# Patient Record
Sex: FEMALE | Race: WHITE | Employment: PART TIME | URBAN - METROPOLITAN AREA
[De-identification: names, ages, dates, MRNs, and addresses within clinical notes are randomized per-mention and may not be internally consistent; named-entity substitution may affect disease eponyms.]

---

## 2022-04-19 ENCOUNTER — INITIAL PRENATAL (OUTPATIENT)
Dept: OBGYN | Age: 32
End: 2022-04-19

## 2022-04-19 VITALS — DIASTOLIC BLOOD PRESSURE: 78 MMHG | SYSTOLIC BLOOD PRESSURE: 125 MMHG | HEART RATE: 75 BPM | WEIGHT: 157.3 LBS

## 2022-04-19 DIAGNOSIS — O26.21 RECURRENT PREGNANCY LOSS IN PREGNANT PATIENT IN FIRST TRIMESTER, ANTEPARTUM: ICD-10-CM

## 2022-04-19 DIAGNOSIS — O26.21 RECURRENT PREGNANCY LOSS IN PREGNANT PATIENT IN FIRST TRIMESTER, ANTEPARTUM: Primary | ICD-10-CM

## 2022-04-19 LAB — GONADOTROPIN, CHORIONIC (HCG) QUANT: 4703 MIU/ML

## 2022-04-19 NOTE — PROGRESS NOTES
Pt last period was 3/13/22  Pt denies concerns    ian was not seen by me today as she is approximately 5 weeks gestation based on her LMP with no ultrasound confirming dates. She has had 3 previous losses. Going to order hCG levels 48 hours apart then based on those we can determine ultrasound scheduling.

## 2022-04-21 LAB — GONADOTROPIN, CHORIONIC (HCG) QUANT: 8614 MIU/ML

## 2022-05-02 ENCOUNTER — TELEPHONE (OUTPATIENT)
Dept: OBGYN | Age: 32
End: 2022-05-02

## 2022-05-02 NOTE — TELEPHONE ENCOUNTER
----- Message from Rigo Portillo DO sent at 4/21/2022  3:01 PM EDT -----  [facility] has had appropriate rise in her hCG level she should now be scheduled for an ultrasound in 4 weeks MFM and then an appointment with me

## 2022-05-02 NOTE — TELEPHONE ENCOUNTER
Called ian advised of  rise in  hcg levels. I   scheduled appointment with dr. Anabel Lara for initial ob ultrasound on 5/17/22. Call was made on 04/26/22 I forgot to chart the phone call.

## 2022-05-17 ENCOUNTER — ANCILLARY PROCEDURE (OUTPATIENT)
Dept: OBGYN CLINIC | Age: 32
End: 2022-05-17
Payer: MEDICAID

## 2022-05-17 ENCOUNTER — INITIAL PRENATAL (OUTPATIENT)
Dept: OBGYN CLINIC | Age: 32
End: 2022-05-17

## 2022-05-17 ENCOUNTER — INITIAL PRENATAL (OUTPATIENT)
Dept: OBGYN | Age: 32
End: 2022-05-17
Payer: MEDICAID

## 2022-05-17 VITALS — SYSTOLIC BLOOD PRESSURE: 119 MMHG | WEIGHT: 156 LBS | HEART RATE: 101 BPM | DIASTOLIC BLOOD PRESSURE: 78 MMHG

## 2022-05-17 DIAGNOSIS — Z3A.09 9 WEEKS GESTATION OF PREGNANCY: Primary | ICD-10-CM

## 2022-05-17 DIAGNOSIS — Z34.90 PREGNANCY, UNSPECIFIED GESTATIONAL AGE: Primary | ICD-10-CM

## 2022-05-17 DIAGNOSIS — Z3A.09 9 WEEKS GESTATION OF PREGNANCY: ICD-10-CM

## 2022-05-17 LAB
AMPHETAMINE SCREEN, URINE: NOT DETECTED
BARBITURATE SCREEN URINE: NOT DETECTED
BENZODIAZEPINE SCREEN, URINE: NOT DETECTED
CANNABINOID SCREEN URINE: POSITIVE
COCAINE METABOLITE SCREEN URINE: NOT DETECTED
FENTANYL SCREEN, URINE: NOT DETECTED
GLUCOSE URINE, POC: NEGATIVE
HCT VFR BLD CALC: 42.6 % (ref 34–48)
HEMOGLOBIN: 14 G/DL (ref 11.5–15.5)
Lab: ABNORMAL
MCH RBC QN AUTO: 30.8 PG (ref 26–35)
MCHC RBC AUTO-ENTMCNC: 32.9 % (ref 32–34.5)
MCV RBC AUTO: 93.8 FL (ref 80–99.9)
METHADONE SCREEN, URINE: NOT DETECTED
OPIATE SCREEN URINE: NOT DETECTED
OXYCODONE URINE: NOT DETECTED
PDW BLD-RTO: 13.1 FL (ref 11.5–15)
PHENCYCLIDINE SCREEN URINE: NOT DETECTED
PLATELET # BLD: 251 E9/L (ref 130–450)
PMV BLD AUTO: 11.2 FL (ref 7–12)
PROTEIN UA: NEGATIVE
RBC # BLD: 4.54 E12/L (ref 3.5–5.5)
WBC # BLD: 7.9 E9/L (ref 4.5–11.5)

## 2022-05-17 PROCEDURE — 99999 PR OFFICE/OUTPT VISIT,PROCEDURE ONLY: CPT | Performed by: OBSTETRICS & GYNECOLOGY

## 2022-05-17 PROCEDURE — 99212 OFFICE O/P EST SF 10 MIN: CPT | Performed by: OBSTETRICS & GYNECOLOGY

## 2022-05-17 PROCEDURE — 81002 URINALYSIS NONAUTO W/O SCOPE: CPT | Performed by: OBSTETRICS & GYNECOLOGY

## 2022-05-17 PROCEDURE — 76817 TRANSVAGINAL US OBSTETRIC: CPT | Performed by: OBSTETRICS & GYNECOLOGY

## 2022-05-17 PROCEDURE — 99203 OFFICE O/P NEW LOW 30 MIN: CPT
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
## 2022-05-17 NOTE — PROGRESS NOTES
Pt here fpr 1st prenatal visit  Pt voiced not fm yet  Pt denies lof,vag bleeding or abd pain  Pt is in room 13

## 2022-05-17 NOTE — PROGRESS NOTES
The patient presented for an ultrasound only for viability/dating. Please see ultrasound report under imaging tab.

## 2022-05-17 NOTE — PATIENT INSTRUCTIONS
Please arrive for your scheduled appointment at least 15 minutes early with your actual insurance card+ a photo ID. Also if you need any refills ordered or have questions, it may take up 48 hours to reply. Please allow ample time for your refills. Call me when you use last refill. Thank you for your cooperation. Call your primary obstetrician with bleeding, leaking of fluid, abdominal tenderness, headache, blurry vision, epigastric pain and increased urinary frequency. If you are experiencing an emergency and need immediate help, call 911 or go to go emergency room or labor and delivery. If you are experiencing an emergency and need immediate help, call 911 or go to go emergency room or labor and delivery.

## 2022-05-18 LAB
ABO/RH: NORMAL
HEPATITIS B SURFACE ANTIGEN INTERPRETATION: NORMAL
HEPATITIS C ANTIBODY INTERPRETATION: NORMAL
HIV-1 AND HIV-2 ANTIBODIES: NORMAL
RPR: NORMAL

## 2022-05-19 LAB — RUBELLA ANTIBODY IGG: NORMAL

## 2022-05-20 ENCOUNTER — HOSPITAL ENCOUNTER (EMERGENCY)
Age: 32
Discharge: HOME | End: 2022-05-20
Payer: MEDICAID

## 2022-05-20 ENCOUNTER — APPOINTMENT (OUTPATIENT)
Dept: ULTRASOUND IMAGING | Age: 32
End: 2022-05-20
Payer: MEDICAID

## 2022-05-20 VITALS
HEIGHT: 63 IN | BODY MASS INDEX: 27.64 KG/M2 | SYSTOLIC BLOOD PRESSURE: 138 MMHG | OXYGEN SATURATION: 100 % | TEMPERATURE: 97.6 F | WEIGHT: 156 LBS | RESPIRATION RATE: 16 BRPM | HEART RATE: 65 BPM | DIASTOLIC BLOOD PRESSURE: 89 MMHG

## 2022-05-20 DIAGNOSIS — O46.8X1 SUBCHORIONIC HEMATOMA IN FIRST TRIMESTER, SINGLE OR UNSPECIFIED FETUS: ICD-10-CM

## 2022-05-20 DIAGNOSIS — O46.90 VAGINAL BLEEDING IN PREGNANCY: Primary | ICD-10-CM

## 2022-05-20 DIAGNOSIS — O41.8X10 SUBCHORIONIC HEMATOMA IN FIRST TRIMESTER, SINGLE OR UNSPECIFIED FETUS: ICD-10-CM

## 2022-05-20 LAB
ABO/RH: NORMAL
ALBUMIN SERPL-MCNC: 4.9 G/DL (ref 3.5–5.2)
ALP BLD-CCNC: 65 U/L (ref 35–104)
ALT SERPL-CCNC: 15 U/L (ref 0–32)
ANION GAP SERPL CALCULATED.3IONS-SCNC: 13 MMOL/L (ref 7–16)
AST SERPL-CCNC: 17 U/L (ref 0–31)
BACTERIA: ABNORMAL /HPF
BASOPHILS ABSOLUTE: 0.04 E9/L (ref 0–0.2)
BASOPHILS RELATIVE PERCENT: 0.4 % (ref 0–2)
BILIRUB SERPL-MCNC: 0.4 MG/DL (ref 0–1.2)
BILIRUBIN URINE: NEGATIVE
BLOOD, URINE: ABNORMAL
BUN BLDV-MCNC: 7 MG/DL (ref 6–20)
CALCIUM SERPL-MCNC: 9.3 MG/DL (ref 8.6–10.2)
CHLAMYDIA BY THIN PREP: NEGATIVE
CHLORIDE BLD-SCNC: 103 MMOL/L (ref 98–107)
CLARITY: CLEAR
CO2: 23 MMOL/L (ref 22–29)
COLOR: YELLOW
CREAT SERPL-MCNC: 0.5 MG/DL (ref 0.5–1)
EOSINOPHILS ABSOLUTE: 0.13 E9/L (ref 0.05–0.5)
EOSINOPHILS RELATIVE PERCENT: 1.4 % (ref 0–6)
GFR AFRICAN AMERICAN: >60
GFR NON-AFRICAN AMERICAN: >60 ML/MIN/1.73
GLUCOSE BLD-MCNC: 90 MG/DL (ref 74–99)
GLUCOSE URINE: NEGATIVE MG/DL
GONADOTROPIN, CHORIONIC (HCG) QUANT: ABNORMAL MIU/ML
HCG, URINE, POC: POSITIVE
HCT VFR BLD CALC: 42.3 % (ref 34–48)
HEMOGLOBIN: 14.3 G/DL (ref 11.5–15.5)
IMMATURE GRANULOCYTES #: 0.04 E9/L
IMMATURE GRANULOCYTES %: 0.4 % (ref 0–5)
KETONES, URINE: NEGATIVE MG/DL
LEUKOCYTE ESTERASE, URINE: NEGATIVE
LYMPHOCYTES ABSOLUTE: 1.68 E9/L (ref 1.5–4)
LYMPHOCYTES RELATIVE PERCENT: 18.3 % (ref 20–42)
Lab: NORMAL
MCH RBC QN AUTO: 31.3 PG (ref 26–35)
MCHC RBC AUTO-ENTMCNC: 33.8 % (ref 32–34.5)
MCV RBC AUTO: 92.6 FL (ref 80–99.9)
MONOCYTES ABSOLUTE: 0.46 E9/L (ref 0.1–0.95)
MONOCYTES RELATIVE PERCENT: 5 % (ref 2–12)
N. GONORRHOEAE DNA, THIN PREP: NEGATIVE
NEGATIVE QC PASS/FAIL: NORMAL
NEUTROPHILS ABSOLUTE: 6.84 E9/L (ref 1.8–7.3)
NEUTROPHILS RELATIVE PERCENT: 74.5 % (ref 43–80)
NITRITE, URINE: NEGATIVE
PDW BLD-RTO: 13 FL (ref 11.5–15)
PH UA: 7.5 (ref 5–9)
PLATELET # BLD: 239 E9/L (ref 130–450)
PMV BLD AUTO: 10 FL (ref 7–12)
POSITIVE QC PASS/FAIL: NORMAL
POTASSIUM SERPL-SCNC: 3.8 MMOL/L (ref 3.5–5)
PROTEIN UA: NEGATIVE MG/DL
RBC # BLD: 4.57 E12/L (ref 3.5–5.5)
RBC UA: ABNORMAL /HPF (ref 0–2)
SODIUM BLD-SCNC: 139 MMOL/L (ref 132–146)
SOURCE: NORMAL
SPECIFIC GRAVITY UA: <=1.005 (ref 1–1.03)
TOTAL PROTEIN: 7.7 G/DL (ref 6.4–8.3)
URINE CULTURE, ROUTINE: NORMAL
UROBILINOGEN, URINE: 0.2 E.U./DL
WBC # BLD: 9.2 E9/L (ref 4.5–11.5)
WBC UA: ABNORMAL /HPF (ref 0–5)

## 2022-05-20 PROCEDURE — 84702 CHORIONIC GONADOTROPIN TEST: CPT

## 2022-05-20 PROCEDURE — 36415 COLL VENOUS BLD VENIPUNCTURE: CPT

## 2022-05-20 PROCEDURE — 99284 EMERGENCY DEPT VISIT MOD MDM: CPT

## 2022-05-20 PROCEDURE — 85025 COMPLETE CBC W/AUTO DIFF WBC: CPT

## 2022-05-20 PROCEDURE — 81001 URINALYSIS AUTO W/SCOPE: CPT

## 2022-05-20 PROCEDURE — 86900 BLOOD TYPING SEROLOGIC ABO: CPT

## 2022-05-20 PROCEDURE — 86901 BLOOD TYPING SEROLOGIC RH(D): CPT

## 2022-05-20 PROCEDURE — 80053 COMPREHEN METABOLIC PANEL: CPT

## 2022-05-20 PROCEDURE — 76801 OB US < 14 WKS SINGLE FETUS: CPT

## 2022-05-20 ASSESSMENT — PAIN - FUNCTIONAL ASSESSMENT: PAIN_FUNCTIONAL_ASSESSMENT: NONE - DENIES PAIN

## 2022-05-20 NOTE — ED NOTES
Department of Emergency Medicine  FIRST PROVIDER TRIAGE NOTE             Independent MLP           5/20/22  12:58 PM EDT    Date of Encounter: 5/20/22   MRN: [id number]      HPI: Alex Cooper is a 36 y.o. female who presents to the ED for Vaginal Bleeding (vaginal bleeding onset 30 min PTA, 10w pregnant)     ROS: Negative for Suicidal ideation or Homicidal Ideation. PE: Gen Appearance/Constitutional: alert     Initial Plan of Care: All treatment areas with department are currently occupied. Plan to order/Initiate the following while awaiting opening in ED: labs and ultrasound.   Initiate Treatment-Testing, Proceed toTreatment Area When Bed Available for ED Attending/MLP to Continue Care    Electronically signed by Denise Hogan PA-C   DD: 5/20/22         Denise Hogan PA-C  05/20/22 1404
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
## 2022-06-16 ENCOUNTER — ROUTINE PRENATAL (OUTPATIENT)
Dept: OBGYN | Age: 32
End: 2022-06-16
Payer: MEDICAID

## 2022-06-16 VITALS
BODY MASS INDEX: 28.34 KG/M2 | SYSTOLIC BLOOD PRESSURE: 130 MMHG | WEIGHT: 160 LBS | DIASTOLIC BLOOD PRESSURE: 88 MMHG | HEART RATE: 82 BPM

## 2022-06-16 DIAGNOSIS — Z3A.13 13 WEEKS GESTATION OF PREGNANCY: Primary | ICD-10-CM

## 2022-06-16 DIAGNOSIS — Z20.2 EXPOSURE TO TRICHOMONAS: ICD-10-CM

## 2022-06-16 LAB
GLUCOSE URINE, POC: NEGATIVE
PROTEIN UA: NEGATIVE

## 2022-06-16 PROCEDURE — 99213 OFFICE O/P EST LOW 20 MIN: CPT | Performed by: OBSTETRICS & GYNECOLOGY

## 2022-06-16 PROCEDURE — 81002 URINALYSIS NONAUTO W/O SCOPE: CPT | Performed by: OBSTETRICS & GYNECOLOGY

## 2022-06-16 PROCEDURE — 99213 OFFICE O/P EST LOW 20 MIN: CPT

## 2022-06-16 NOTE — PROGRESS NOTES
Pt here for ob visit  Pt voiced nausea in am and a lot of fatigue  Pt voiced 2 days after last appointment was seen in er for bleeding (5/20) all was ok  Pt voiced spotting after urinating a few times but denies lof  Pt voiced on right side only twinges that are uncomfotable

## 2022-06-16 NOTE — PROGRESS NOTES
Labs reviewed needs rubella postpartum reviewed THC in pregnancy she has reported stopping. She is now seeing a counselor for depression. Reviewed trichomonas on Pap culture obtained declines panorama and carrier status screening. Declined quad screen.   Nausea significantly improved

## 2022-06-18 LAB — CULTURE, TRICHOMONAS: NORMAL

## 2022-06-20 NOTE — PROGRESS NOTES
She tested positive for trichomonas on culture I will be sending in Flagyl 250 3 times daily for 7 days

## 2022-06-21 ENCOUNTER — TELEPHONE (OUTPATIENT)
Dept: OBGYN | Age: 32
End: 2022-06-21

## 2022-06-21 RX ORDER — METRONIDAZOLE 250 MG/1
250 TABLET ORAL 3 TIMES DAILY
Qty: 21 TABLET | Refills: 0 | Status: SHIPPED | OUTPATIENT
Start: 2022-06-21 | End: 2022-06-22 | Stop reason: SDUPTHER

## 2022-06-22 DIAGNOSIS — Z34.90 PREGNANCY, UNSPECIFIED GESTATIONAL AGE: Primary | ICD-10-CM

## 2022-06-22 RX ORDER — METRONIDAZOLE 250 MG/1
250 TABLET ORAL 3 TIMES DAILY
Qty: 21 TABLET | Refills: 0 | Status: SHIPPED | OUTPATIENT
Start: 2022-06-22 | End: 2022-06-29

## 2022-07-21 ENCOUNTER — ROUTINE PRENATAL (OUTPATIENT)
Dept: OBGYN | Age: 32
End: 2022-07-21
Payer: MEDICAID

## 2022-07-21 VITALS
SYSTOLIC BLOOD PRESSURE: 122 MMHG | DIASTOLIC BLOOD PRESSURE: 74 MMHG | HEART RATE: 88 BPM | BODY MASS INDEX: 31.18 KG/M2 | WEIGHT: 176 LBS

## 2022-07-21 DIAGNOSIS — Z3A.18 18 WEEKS GESTATION OF PREGNANCY: Primary | ICD-10-CM

## 2022-07-21 LAB
GLUCOSE URINE, POC: NEGATIVE
PROTEIN UA: NEGATIVE

## 2022-07-21 PROCEDURE — 99213 OFFICE O/P EST LOW 20 MIN: CPT

## 2022-07-21 PROCEDURE — 81002 URINALYSIS NONAUTO W/O SCOPE: CPT | Performed by: OBSTETRICS & GYNECOLOGY

## 2022-07-21 PROCEDURE — 99213 OFFICE O/P EST LOW 20 MIN: CPT | Performed by: OBSTETRICS & GYNECOLOGY

## 2022-07-21 NOTE — PROGRESS NOTES
Pt here for ob visit  No fm yet  Pt denies lof vag bleeding or contractions  Pt voiced sister has to have cs at 36 weeks d/t placenta abruption and has been on bed rest since 32 weeks.

## 2022-08-04 ENCOUNTER — ROUTINE PRENATAL (OUTPATIENT)
Dept: OBGYN CLINIC | Age: 32
End: 2022-08-04
Payer: MEDICAID

## 2022-08-04 ENCOUNTER — ANCILLARY PROCEDURE (OUTPATIENT)
Dept: OBGYN CLINIC | Age: 32
End: 2022-08-04
Payer: MEDICAID

## 2022-08-04 VITALS
SYSTOLIC BLOOD PRESSURE: 130 MMHG | DIASTOLIC BLOOD PRESSURE: 85 MMHG | BODY MASS INDEX: 32.08 KG/M2 | WEIGHT: 181.13 LBS | HEART RATE: 101 BPM

## 2022-08-04 DIAGNOSIS — Z3A.20 20 WEEKS GESTATION OF PREGNANCY: ICD-10-CM

## 2022-08-04 DIAGNOSIS — Z36.3 ENCOUNTER FOR ANTENATAL SCREENING FOR MALFORMATION USING ULTRASOUND: Primary | ICD-10-CM

## 2022-08-04 DIAGNOSIS — Z03.75 SUSPECTED SHORTENING OF CERVIX NOT FOUND: ICD-10-CM

## 2022-08-04 LAB
GLUCOSE URINE, POC: NEGATIVE
PROTEIN UA: NEGATIVE

## 2022-08-04 PROCEDURE — 76817 TRANSVAGINAL US OBSTETRIC: CPT | Performed by: OBSTETRICS & GYNECOLOGY

## 2022-08-04 PROCEDURE — 81002 URINALYSIS NONAUTO W/O SCOPE: CPT | Performed by: OBSTETRICS & GYNECOLOGY

## 2022-08-04 PROCEDURE — 76811 OB US DETAILED SNGL FETUS: CPT | Performed by: OBSTETRICS & GYNECOLOGY

## 2022-08-04 PROCEDURE — 99213 OFFICE O/P EST LOW 20 MIN: CPT | Performed by: OBSTETRICS & GYNECOLOGY

## 2022-08-04 PROCEDURE — 99999 PR OFFICE/OUTPT VISIT,PROCEDURE ONLY: CPT | Performed by: OBSTETRICS & GYNECOLOGY

## 2022-08-04 NOTE — PROGRESS NOTES
22    Juan Pritchard, DO  [address] [address] [facility]     RE:  Shaye Wilkins  : 1990   AGE: 36 y.o. Dear Dr. Madeline Rucker:    Adia Nye scheduled in my office today for a fetal ultrasound assessment only. A detailed ultrasound report is included in the EMR under the media tab from today's date. A living steele intrauterine fetus was identified in the cephalic presentation, with normal fetal heart motion and normal fetal motion noted. The amniotic fluid volume was within normal limits. The estimated fetal weight was 376 grams. The placenta was on the anterior surface of the uterus. The biometric measurements were consistent with the patient's established dating parameters, within the limits of error the test at the current gestational age. No apparent gross fetal anatomic abnormalities were identified in the areas which were visualized. Ultrasound is not diagnostic for fetal aneuploidy or other karyotype abnormalities, and may not detect all structural fetal defects, even if multiple examinations are performed during a  pregnancy. Normal ultrasound findings did not equate with a normal fetal outcome. Transvaginal ultrasound assessment of the cervix was performed. The cervical length was 44.9 mm, without funneling of the amniotic membranes. No further follow-up assessments have been scheduled in our office, however; we would be happy to see your patient at any time if you request.    A repeat scan in the third trimester is recommended to reassess fetal anatomy and growth. Some fetal anomalies may not be apparent on the initial assessment but develop as the pregnancy progresses.      If you have any questions regarding her management, please contact me at your convenience and thank you for allowing me to participate in her care    Sincerely,        Arun Rodriguez MD, MS, FACOG, FACS, RDCS, [address], [facility]  Director [facility]  [phone]

## 2022-08-04 NOTE — PATIENT INSTRUCTIONS
Please arrive for your scheduled appointment at least 15 minutes early with your actual insurance card+ a photo ID. Also if you need any refills ordered or have questions, it may take up 48 hours to reply. Please allow ample time for your refills. Call me when you use last refill. Thank you for your cooperation. Call your primary obstetrician with bleeding, leaking of fluid, abdominal tenderness, headache, blurry vision, epigastric pain and increased urinary frequency. If you are experiencing an emergency and need immediate help, call 911 or go to go emergency room or labor and delivery. if you are sick, not feeling well or have an infectious process going on please reschedule your appointment by calling [phone]. Also if any family members are not feeling well, please do not bring them to your appointment. We appreciate your cooperation. We are doing this in order to protect our pregnant mothers+ their babies. if you are sick, not feeling well or have an infectious process going on please reschedule your appointment by calling [phone]. Also if any family members are not feeling well, please do not bring them to your appointment. We appreciate your cooperation. We are doing this in order to protect our pregnant mothers+ their babies.

## 2022-08-18 ENCOUNTER — INITIAL PRENATAL (OUTPATIENT)
Dept: OBGYN | Age: 32
End: 2022-08-18
Payer: MEDICAID

## 2022-08-18 VITALS
SYSTOLIC BLOOD PRESSURE: 124 MMHG | HEART RATE: 109 BPM | BODY MASS INDEX: 32.95 KG/M2 | DIASTOLIC BLOOD PRESSURE: 78 MMHG | WEIGHT: 186 LBS

## 2022-08-18 DIAGNOSIS — Z3A.22 22 WEEKS GESTATION OF PREGNANCY: Primary | ICD-10-CM

## 2022-08-18 LAB
GLUCOSE URINE, POC: NEGATIVE
PROTEIN UA: NEGATIVE

## 2022-08-18 PROCEDURE — 81002 URINALYSIS NONAUTO W/O SCOPE: CPT | Performed by: OBSTETRICS & GYNECOLOGY

## 2022-08-18 PROCEDURE — 99213 OFFICE O/P EST LOW 20 MIN: CPT | Performed by: OBSTETRICS & GYNECOLOGY

## 2022-08-18 NOTE — PATIENT INSTRUCTIONS
Please arrive for your scheduled appointment at least 15 minutes early with your actual insurance card+ a photo ID. Also if you need any refills ordered or have questions, it may take up 48 hours to reply. Please allow ample time for your refills. Call me when you use last refill. Thank you for your cooperation. You might be having an NST at your next appt. Please eat a large snack or breakfast before coming to office. Thank you Call your primary obstetrician with bleeding, leaking of fluid, abdominal tenderness, headache, blurry vision, epigastric pain and increased urinary frequency. If you are experiencing an emergency and need immediate help, call 911 or go to go emergency room or labor and delivery. Do kick counts after dinner. Call your primary obstetrician if less than 10 kicks in 2 hours after dinner. Call your primary obstetrician with bleeding, leaking of fluid, abdominal tenderness, headache, blurry vision, epigastric pain and increased urinary frequency. Do kick counts after dinner. Call your primary obstetrician if less than 10 kicks in 2 hours after dinner. Call your primary obstetrician with bleeding, leaking of fluid, abdominal tenderness, headache, blurry vision, epigastric pain and increased urinary frequency.

## 2022-08-18 NOTE — PROGRESS NOTES
US reviewed normal anatomy. No BLD, LO F or CTN's. Has counseling appointment this afternoon feels she is doing well.

## 2022-08-18 NOTE — PROGRESS NOTES
Routine ob visit  +fm  No vag or contactions  Pt voiced her sister had emergency cs placenta detatched

## 2022-09-14 ENCOUNTER — ROUTINE PRENATAL (OUTPATIENT)
Dept: OBGYN | Age: 32
End: 2022-09-14
Payer: MEDICAID

## 2022-09-14 VITALS
HEART RATE: 89 BPM | SYSTOLIC BLOOD PRESSURE: 120 MMHG | WEIGHT: 196 LBS | BODY MASS INDEX: 34.72 KG/M2 | DIASTOLIC BLOOD PRESSURE: 82 MMHG

## 2022-09-14 DIAGNOSIS — Z3A.26 26 WEEKS GESTATION OF PREGNANCY: Primary | ICD-10-CM

## 2022-09-14 LAB
GLUCOSE URINE, POC: NEGATIVE
PROTEIN UA: NEGATIVE

## 2022-09-14 PROCEDURE — 99213 OFFICE O/P EST LOW 20 MIN: CPT | Performed by: OBSTETRICS & GYNECOLOGY

## 2022-09-14 PROCEDURE — 81002 URINALYSIS NONAUTO W/O SCOPE: CPT | Performed by: OBSTETRICS & GYNECOLOGY

## 2022-09-14 NOTE — PROGRESS NOTES
Seeing counselor every 2 weeks doing much better.   No BLD, LO F or CTN's.  26/28-week labs given  Tdap next visit

## 2022-09-14 NOTE — PATIENT INSTRUCTIONS
Please arrive for your scheduled appointment at least 15 minutes early with your actual insurance card+ a photo ID. Also if you need any refills ordered or have questions, it may take up 48 hours to reply. Please allow ample time for your refills. Call me when you use last refill. Thank you for your cooperation. You might be having an NST at your next appt. Please eat a large snack or breakfast before coming to office. Thank you If you are experiencing an emergency and need immediate help, call 911 or go to go emergency room or labor and delivery. Do kick counts after dinner. Call your primary obstetrician if less than 10 kicks in 2 hours after dinner. Call your primary obstetrician with bleeding, leaking of fluid, abdominal tenderness, headache, blurry vision, epigastric pain and increased urinary frequency. Do kick counts after dinner. Call your primary obstetrician if less than 10 kicks in 2 hours after dinner. Call your primary obstetrician with bleeding, leaking of fluid, abdominal tenderness, headache, blurry vision, epigastric pain and increased urinary frequency.

## 2022-09-19 DIAGNOSIS — Z3A.26 26 WEEKS GESTATION OF PREGNANCY: ICD-10-CM

## 2022-09-19 LAB
GLUCOSE FASTING: 83 MG/DL
GLUCOSE TOLERANCE SCREEN 50G: 129 MG/DL (ref 70–140)
GLUCOSE TOLERANCE TEST 2 HOUR: 135 MG/DL
HCT VFR BLD CALC: 34.7 % (ref 34–48)
HEMOGLOBIN: 11.8 G/DL (ref 11.5–15.5)
MCH RBC QN AUTO: 32.5 PG (ref 26–35)
MCHC RBC AUTO-ENTMCNC: 34 % (ref 32–34.5)
MCV RBC AUTO: 95.6 FL (ref 80–99.9)
PDW BLD-RTO: 12.7 FL (ref 11.5–15)
PLATELET # BLD: 240 E9/L (ref 130–450)
PMV BLD AUTO: 10.6 FL (ref 7–12)
RBC # BLD: 3.63 E12/L (ref 3.5–5.5)
WBC # BLD: 7.9 E9/L (ref 4.5–11.5)

## 2022-09-20 LAB — RPR: NORMAL

## 2022-09-29 ENCOUNTER — ROUTINE PRENATAL (OUTPATIENT)
Dept: OBGYN | Age: 32
End: 2022-09-29
Payer: MEDICAID

## 2022-09-29 VITALS
DIASTOLIC BLOOD PRESSURE: 64 MMHG | WEIGHT: 199.9 LBS | BODY MASS INDEX: 35.41 KG/M2 | SYSTOLIC BLOOD PRESSURE: 120 MMHG | HEART RATE: 81 BPM

## 2022-09-29 DIAGNOSIS — Z3A.28 28 WEEKS GESTATION OF PREGNANCY: Primary | ICD-10-CM

## 2022-09-29 LAB
GLUCOSE URINE, POC: NEGATIVE
PROTEIN UA: NEGATIVE

## 2022-09-29 PROCEDURE — 99213 OFFICE O/P EST LOW 20 MIN: CPT | Performed by: OBSTETRICS & GYNECOLOGY

## 2022-09-29 PROCEDURE — 81002 URINALYSIS NONAUTO W/O SCOPE: CPT | Performed by: OBSTETRICS & GYNECOLOGY

## 2022-09-29 NOTE — PROGRESS NOTES
Tdap today. Labs reviewed all normal.  No BLD, LO F or CTN's. No S&S of preeclampsia. Has counseling appointment today doing well emotionally.

## 2022-09-29 NOTE — PROGRESS NOTES
Pt here for routine ob  +fm  Pt denies lof vag bleeding or contractions  Pt voiced baby less active Monday and Tuesday but last 2 days back to normal  T-dap given today

## 2022-10-10 ENCOUNTER — ROUTINE PRENATAL (OUTPATIENT)
Dept: OBGYN | Age: 32
End: 2022-10-10
Payer: MEDICAID

## 2022-10-10 VITALS
BODY MASS INDEX: 35.75 KG/M2 | HEART RATE: 112 BPM | DIASTOLIC BLOOD PRESSURE: 80 MMHG | SYSTOLIC BLOOD PRESSURE: 117 MMHG | WEIGHT: 201.8 LBS

## 2022-10-10 DIAGNOSIS — Z3A.30 30 WEEKS GESTATION OF PREGNANCY: Primary | ICD-10-CM

## 2022-10-10 LAB
GLUCOSE URINE, POC: NEGATIVE
PROTEIN UA: NEGATIVE

## 2022-10-10 PROCEDURE — 81002 URINALYSIS NONAUTO W/O SCOPE: CPT | Performed by: OBSTETRICS & GYNECOLOGY

## 2022-10-10 PROCEDURE — 99213 OFFICE O/P EST LOW 20 MIN: CPT | Performed by: OBSTETRICS & GYNECOLOGY

## 2022-10-10 NOTE — PROGRESS NOTES
Emotionally doing well. No SS PIH. No BLD, LO F or CTN's. Sleep is sometimes an issue. Reviewed Benadryl is okay to take. Ultrasound in 2 weeks for growth.  Desires nexplanon

## 2022-10-27 ENCOUNTER — ROUTINE PRENATAL (OUTPATIENT)
Dept: OBGYN CLINIC | Age: 32
End: 2022-10-27

## 2022-10-27 ENCOUNTER — ANCILLARY PROCEDURE (OUTPATIENT)
Dept: OBGYN CLINIC | Age: 32
End: 2022-10-27
Payer: MEDICAID

## 2022-10-27 ENCOUNTER — ROUTINE PRENATAL (OUTPATIENT)
Dept: OBGYN | Age: 32
End: 2022-10-27
Payer: MEDICAID

## 2022-10-27 VITALS
WEIGHT: 200 LBS | DIASTOLIC BLOOD PRESSURE: 74 MMHG | SYSTOLIC BLOOD PRESSURE: 120 MMHG | HEART RATE: 99 BPM | BODY MASS INDEX: 35.43 KG/M2

## 2022-10-27 DIAGNOSIS — Z34.90 PREGNANCY, UNSPECIFIED GESTATIONAL AGE: ICD-10-CM

## 2022-10-27 DIAGNOSIS — Z03.74 FETAL GROWTH PROBLEM SUSPECTED BUT NOT FOUND: Primary | ICD-10-CM

## 2022-10-27 DIAGNOSIS — Z3A.32 32 WEEKS GESTATION OF PREGNANCY: Primary | ICD-10-CM

## 2022-10-27 PROBLEM — Z36.3 ENCOUNTER FOR ANTENATAL SCREENING FOR MALFORMATION USING ULTRASOUND: Status: RESOLVED | Noted: 2022-08-04 | Resolved: 2022-10-27

## 2022-10-27 LAB
GLUCOSE URINE, POC: NEGATIVE
PROTEIN UA: NEGATIVE

## 2022-10-27 PROCEDURE — 99999 PR OFFICE/OUTPT VISIT,PROCEDURE ONLY: CPT | Performed by: OBSTETRICS & GYNECOLOGY

## 2022-10-27 PROCEDURE — 81002 URINALYSIS NONAUTO W/O SCOPE: CPT | Performed by: OBSTETRICS & GYNECOLOGY

## 2022-10-27 PROCEDURE — 99213 OFFICE O/P EST LOW 20 MIN: CPT | Performed by: OBSTETRICS & GYNECOLOGY

## 2022-10-27 PROCEDURE — 76805 OB US >/= 14 WKS SNGL FETUS: CPT | Performed by: OBSTETRICS & GYNECOLOGY

## 2022-10-27 NOTE — PROGRESS NOTES
Doing well emotionally continues with counseling. No SS PIH. Good FM. No CTN's. Afluria at today. Ultrasound for growth today. Consent for Nexplanon signed today.

## 2022-10-27 NOTE — PROGRESS NOTES
10/27/22     RE:  Penelope Blankenship  : 1990   AGE: 36 y.o. Dear Dr. Orlando Fairly:    Jose David Santillandaquan scheduled in my office today for a fetal ultrasound assessment only. A detailed ultrasound report is included in the EMR under the media tab from today's date. A living steele intrauterine fetus was identified in the cephalic presentation, with normal fetal heart motion and normal fetal motion noted. The placenta was on the anterior surface of the uterus. The amniotic fluid index was 16.7 cm. The estimated fetal weight was at the 62nd growth percentile for gestational age. No apparent gross fetal anatomic abnormalities were identified in the areas which were visualized. The biophysical profile and cord Doppler studies were both reassuring. There was no evidence of absence, or reversal of end-diastolic flow. Ultrasound is not diagnostic for fetal aneuploidy or other karyotype abnormalities, and may not detect all structural fetal defects, even if multiple examinations are performed during a  pregnancy. Normal ultrasound findings did not equate with a normal fetal outcome.     No further follow-up assessments have been scheduled in our office, however; we would be happy to see your patient at any time if you request.    If you have any questions regarding her management, please contact me at your convenience and thank you for allowing me to participate in her care    Sincerely,        Nancy Alcaraz MD, MS, Tyesha Soto, [address] [facility], [facility] Garcia, [facility]  Director [address] [place] [address]  [phone]

## 2022-11-10 ENCOUNTER — ROUTINE PRENATAL (OUTPATIENT)
Dept: OBGYN | Age: 32
End: 2022-11-10
Payer: MEDICAID

## 2022-11-10 VITALS
WEIGHT: 210.4 LBS | SYSTOLIC BLOOD PRESSURE: 130 MMHG | HEART RATE: 82 BPM | DIASTOLIC BLOOD PRESSURE: 82 MMHG | BODY MASS INDEX: 37.27 KG/M2

## 2022-11-10 DIAGNOSIS — Z34.80 SUPERVISION OF OTHER NORMAL PREGNANCY, ANTEPARTUM: Primary | ICD-10-CM

## 2022-11-10 LAB
GLUCOSE URINE, POC: NEGATIVE
PROTEIN UA: NEGATIVE

## 2022-11-10 PROCEDURE — 99213 OFFICE O/P EST LOW 20 MIN: CPT | Performed by: OBSTETRICS & GYNECOLOGY

## 2022-11-10 PROCEDURE — 81002 URINALYSIS NONAUTO W/O SCOPE: CPT | Performed by: OBSTETRICS & GYNECOLOGY

## 2022-11-10 NOTE — PROGRESS NOTES
Good FM no BLD, LO F or CTN's. Does note pelvic pressure. No SS PIH.   32-week ultrasound appropriate growth

## 2022-11-10 NOTE — PROGRESS NOTES
Routine ob  +fm  Pt denies lof vag bleeding or contractions  Pt voiced baby feels low and a lot of pelvic pressure and round ligament pain

## 2022-11-23 ENCOUNTER — ROUTINE PRENATAL (OUTPATIENT)
Dept: OBGYN | Age: 32
End: 2022-11-23
Payer: MEDICAID

## 2022-11-23 VITALS
SYSTOLIC BLOOD PRESSURE: 124 MMHG | WEIGHT: 214 LBS | BODY MASS INDEX: 37.91 KG/M2 | DIASTOLIC BLOOD PRESSURE: 79 MMHG | HEART RATE: 94 BPM

## 2022-11-23 DIAGNOSIS — Z34.80 SUPERVISION OF OTHER NORMAL PREGNANCY, ANTEPARTUM: ICD-10-CM

## 2022-11-23 DIAGNOSIS — Z34.80 SUPERVISION OF OTHER NORMAL PREGNANCY, ANTEPARTUM: Primary | ICD-10-CM

## 2022-11-23 LAB
PROTEIN UA: NEGATIVE
PROTEIN UA: NEGATIVE

## 2022-11-23 PROCEDURE — 81002 URINALYSIS NONAUTO W/O SCOPE: CPT | Performed by: OBSTETRICS & GYNECOLOGY

## 2022-11-23 PROCEDURE — 99213 OFFICE O/P EST LOW 20 MIN: CPT | Performed by: OBSTETRICS & GYNECOLOGY

## 2022-11-23 NOTE — PROGRESS NOTES
Routine prenatal  +fm  Pt denies lof vag bleeding or conrtractions  Pt voiced lower back cramping and legs cramping

## 2022-11-27 LAB — GROUP B STREP CULTURE: NORMAL

## 2022-12-01 ENCOUNTER — ROUTINE PRENATAL (OUTPATIENT)
Dept: OBGYN CLINIC | Age: 32
End: 2022-12-01
Payer: MEDICAID

## 2022-12-01 VITALS
WEIGHT: 214 LBS | DIASTOLIC BLOOD PRESSURE: 89 MMHG | SYSTOLIC BLOOD PRESSURE: 138 MMHG | BODY MASS INDEX: 37.91 KG/M2 | HEART RATE: 110 BPM

## 2022-12-01 DIAGNOSIS — Z34.80 SUPERVISION OF OTHER NORMAL PREGNANCY, ANTEPARTUM: Primary | ICD-10-CM

## 2022-12-01 LAB
GLUCOSE URINE, POC: NEGATIVE
PROTEIN UA: NEGATIVE

## 2022-12-01 PROCEDURE — 81002 URINALYSIS NONAUTO W/O SCOPE: CPT | Performed by: OBSTETRICS & GYNECOLOGY

## 2022-12-01 PROCEDURE — 99213 OFFICE O/P EST LOW 20 MIN: CPT | Performed by: OBSTETRICS & GYNECOLOGY

## 2022-12-01 NOTE — LETTER
[address] [place] [address]  [address] [facility] [address] [address]  Dept: [phone]  Dept Fax: [phone]                12/1/2022    To Whom It May Concern:    RE: Corie Vides PRS:[id number]      Corie Vides is a pregnant patient who requires dental care. Please use an abdominal shield for any x-ray studies. Please avoid non-steroidal anti-inflammatory agents, such as ibuprofen or naproxen. Antibiotics are safe other than tetracycline. Acetaminophen and narcotics are safe to administer. Local anesthetics (e.g. lidocaine) are permissible also. If you have additional questions, feel free to contact us at [phone] and leave a message using the patient's medical record number listed beneath her name. Thank you for caring for our patient.     Sincerely,        Al Green DO  OB/GYN Department

## 2022-12-07 ENCOUNTER — ROUTINE PRENATAL (OUTPATIENT)
Dept: OBGYN | Age: 32
End: 2022-12-07
Payer: MEDICAID

## 2022-12-07 VITALS
SYSTOLIC BLOOD PRESSURE: 126 MMHG | BODY MASS INDEX: 38.24 KG/M2 | WEIGHT: 215.9 LBS | DIASTOLIC BLOOD PRESSURE: 90 MMHG | HEART RATE: 90 BPM

## 2022-12-07 DIAGNOSIS — Z34.93 SUPERVISION OF LOW-RISK PREGNANCY, THIRD TRIMESTER: Primary | ICD-10-CM

## 2022-12-07 DIAGNOSIS — Z34.00 SUPERVISION OF NORMAL FIRST PREGNANCY, ANTEPARTUM: ICD-10-CM

## 2022-12-07 LAB
GLUCOSE URINE, POC: NEGATIVE
PROTEIN UA: NEGATIVE

## 2022-12-07 PROCEDURE — 99213 OFFICE O/P EST LOW 20 MIN: CPT | Performed by: ADVANCED PRACTICE MIDWIFE

## 2022-12-07 PROCEDURE — 81002 URINALYSIS NONAUTO W/O SCOPE: CPT | Performed by: ADVANCED PRACTICE MIDWIFE

## 2022-12-07 NOTE — PROGRESS NOTES
Routine ob  +fm  Pt denies lof vag bleeding or contractions  Pt voiced lower back contractions irregular and very painful  very fatigued, random left and right hip pain and very fatigued
urine qual dipstick protein              Plan:  The patient will return to the office for her next visit in 1 weeks. See antepartum flow sheet.

## 2022-12-09 PROBLEM — Z34.00 SUPERVISION OF NORMAL FIRST PREGNANCY, ANTEPARTUM: Status: ACTIVE | Noted: 2022-12-09

## 2022-12-09 PROBLEM — Z34.93 SUPERVISION OF LOW-RISK PREGNANCY, THIRD TRIMESTER: Status: ACTIVE | Noted: 2022-12-09

## 2022-12-14 ENCOUNTER — ROUTINE PRENATAL (OUTPATIENT)
Dept: OBGYN | Age: 32
End: 2022-12-14
Payer: MEDICAID

## 2022-12-14 VITALS
WEIGHT: 220 LBS | SYSTOLIC BLOOD PRESSURE: 127 MMHG | DIASTOLIC BLOOD PRESSURE: 87 MMHG | HEART RATE: 99 BPM | BODY MASS INDEX: 38.97 KG/M2

## 2022-12-14 DIAGNOSIS — Z3A.39 PREGNANCY WITH 39 COMPLETED WEEKS GESTATION: ICD-10-CM

## 2022-12-14 DIAGNOSIS — Z34.80 SUPERVISION OF OTHER NORMAL PREGNANCY, ANTEPARTUM: Primary | ICD-10-CM

## 2022-12-14 LAB
GLUCOSE URINE, POC: NEGATIVE
PROTEIN UA: NEGATIVE

## 2022-12-14 PROCEDURE — 81002 URINALYSIS NONAUTO W/O SCOPE: CPT | Performed by: ADVANCED PRACTICE MIDWIFE

## 2022-12-14 PROCEDURE — 99213 OFFICE O/P EST LOW 20 MIN: CPT | Performed by: ADVANCED PRACTICE MIDWIFE

## 2022-12-14 RX ORDER — LORATADINE 10 MG/1
10 TABLET ORAL DAILY
Status: ON HOLD | COMMUNITY

## 2022-12-14 NOTE — PROGRESS NOTES
Emma Fuentes is a 29 y.o. female 39w3d, here for prenatal visit, desires induction, does not want to be in labor on [place]. [id number]    OB History    Para Term  AB Living   4 0 0   3     SAB IAB Ectopic Molar Multiple Live Births   2 1              # Outcome Date GA Lbr Thierry/2nd Weight Sex Delivery Anes PTL Lv   4 Current            3 IAB            2 SAB            1 SAB                  Mother's Prenatal Vitals  BP: 127/87  Weight: 220 lb (99.8 kg)  Heart Rate: 99  Patient Position: Sitting  Alb/Glu  Albumin: Negative  Glucose: Negative  Prenatal Fetal Information  Movement: Present      The patient was seen and evaluated. There was positive fetal movements. No contractions or leakage of fluid. Signs and symptoms of labor were reviewed. The S/S of Pre-Eclampsia were reviewed with the patient in detail. She is to report any of these if they occur. She currently denies any of these. Allergies: Allergies as of 2022    (No Known Allergies)         Group Beta Strep collection was completed. Yes    The patient was found to be GBS: negative    Cervical Exam was:   1 cm dilated  /50/-3/vtx  The patient was counseled on head she has been instructed to call the office at anytime prior to going into the hospital so the on-call physician may direct her to the appropriate facility for care. Exceptions were reviewed including but not limited to: Decreased fetal movement, vaginal Bleeding or hemorrhage, trauma, readily expectant delivery, or any instance where she feels 911 should be utilized. The patient was counseled on Labor & Delivery. Yes  Route of delivery and counseling on vaginal, operative vaginal, and  sections were completed with the risks of each to both the patient as well as her baby. The possibility of a blood transfusion was discussed as well. The patient was not opposed to receiving a transfusion if needed.  The patient was counseled on types of analgesia during labor and is considering either Regional or IV medication the risks, benefits and alternatives were discussed. Assessment:  1. Bird Herrera is a 29 y.o. female  2.   3. 39w3d  4. Induction has been scheduled for 7 AM Friday    Patient Active Problem List    Diagnosis Date Noted    Supervision of low-risk pregnancy, third trimester 2022     Priority: Medium    Fetal growth problem suspected but not found 10/27/2022     Priority: Medium        Diagnosis Orders   1. Supervision of other normal pregnancy, antepartum  POCT urine qual dipstick glucose    POCT urine qual dipstick protein              Plan:  The patient will return to the office for her next visit in 2 weeks post delivery (1 if  delivery). See antepartum flow sheet.

## 2022-12-17 ENCOUNTER — APPOINTMENT (OUTPATIENT)
Dept: LABOR AND DELIVERY | Age: 32
DRG: 560 | End: 2022-12-17
Payer: MEDICAID

## 2022-12-17 ENCOUNTER — HOSPITAL ENCOUNTER (INPATIENT)
Age: 32
LOS: 4 days | Discharge: HOME | DRG: 560 | End: 2022-12-21
Attending: OBSTETRICS & GYNECOLOGY | Admitting: OBSTETRICS & GYNECOLOGY
Payer: MEDICAID

## 2022-12-17 PROBLEM — Z3A.39 39 WEEKS GESTATION OF PREGNANCY: Status: ACTIVE | Noted: 2022-12-17

## 2022-12-17 LAB
ABO/RH: NORMAL
AMPHETAMINE SCREEN, URINE: NOT DETECTED
ANTIBODY SCREEN: NORMAL
BARBITURATE SCREEN URINE: NOT DETECTED
BENZODIAZEPINE SCREEN, URINE: NOT DETECTED
CANNABINOID SCREEN URINE: NOT DETECTED
COCAINE METABOLITE SCREEN URINE: NOT DETECTED
FENTANYL SCREEN, URINE: NOT DETECTED
HCT VFR BLD CALC: 34.7 % (ref 34–48)
HEMOGLOBIN: 11.5 G/DL (ref 11.5–15.5)
Lab: NORMAL
MCH RBC QN AUTO: 28.5 PG (ref 26–35)
MCHC RBC AUTO-ENTMCNC: 33.1 % (ref 32–34.5)
MCV RBC AUTO: 85.9 FL (ref 80–99.9)
METHADONE SCREEN, URINE: NOT DETECTED
OPIATE SCREEN URINE: NOT DETECTED
OXYCODONE URINE: NOT DETECTED
PDW BLD-RTO: 14.6 FL (ref 11.5–15)
PHENCYCLIDINE SCREEN URINE: NOT DETECTED
PLATELET # BLD: 272 E9/L (ref 130–450)
PMV BLD AUTO: 10.1 FL (ref 7–12)
RBC # BLD: 4.04 E12/L (ref 3.5–5.5)
WBC # BLD: 8.8 E9/L (ref 4.5–11.5)

## 2022-12-17 PROCEDURE — 86901 BLOOD TYPING SEROLOGIC RH(D): CPT

## 2022-12-17 PROCEDURE — 86900 BLOOD TYPING SEROLOGIC ABO: CPT

## 2022-12-17 PROCEDURE — 99221 1ST HOSP IP/OBS SF/LOW 40: CPT | Performed by: OBSTETRICS & GYNECOLOGY

## 2022-12-17 PROCEDURE — 1220000001 HC SEMI PRIVATE L&D R&B

## 2022-12-17 PROCEDURE — 2580000003 HC RX 258: Performed by: OBSTETRICS & GYNECOLOGY

## 2022-12-17 PROCEDURE — 85027 COMPLETE CBC AUTOMATED: CPT

## 2022-12-17 PROCEDURE — 36415 COLL VENOUS BLD VENIPUNCTURE: CPT

## 2022-12-17 PROCEDURE — 6360000002 HC RX W HCPCS

## 2022-12-17 PROCEDURE — 86850 RBC ANTIBODY SCREEN: CPT

## 2022-12-17 PROCEDURE — 80307 DRUG TEST PRSMV CHEM ANLYZR: CPT

## 2022-12-17 RX ORDER — METHYLERGONOVINE MALEATE 0.2 MG/ML
200 INJECTION INTRAVENOUS PRN
Status: DISCONTINUED | OUTPATIENT
Start: 2022-12-17 | End: 2022-12-19 | Stop reason: HOSPADM

## 2022-12-17 RX ORDER — ACETAMINOPHEN 325 MG/1
650 TABLET ORAL EVERY 4 HOURS PRN
Status: DISCONTINUED | OUTPATIENT
Start: 2022-12-17 | End: 2022-12-21 | Stop reason: HOSPADM

## 2022-12-17 RX ORDER — CARBOPROST TROMETHAMINE 250 UG/ML
250 INJECTION, SOLUTION INTRAMUSCULAR PRN
Status: DISCONTINUED | OUTPATIENT
Start: 2022-12-17 | End: 2022-12-19 | Stop reason: HOSPADM

## 2022-12-17 RX ORDER — ONDANSETRON 2 MG/ML
4 INJECTION INTRAMUSCULAR; INTRAVENOUS EVERY 6 HOURS PRN
Status: DISCONTINUED | OUTPATIENT
Start: 2022-12-17 | End: 2022-12-19 | Stop reason: HOSPADM

## 2022-12-17 RX ORDER — ACETAMINOPHEN 650 MG
TABLET, EXTENDED RELEASE ORAL
Status: DISPENSED
Start: 2022-12-17 | End: 2022-12-18

## 2022-12-17 RX ORDER — SODIUM CHLORIDE, SODIUM LACTATE, POTASSIUM CHLORIDE, AND CALCIUM CHLORIDE .6; .31; .03; .02 G/100ML; G/100ML; G/100ML; G/100ML
500 INJECTION, SOLUTION INTRAVENOUS PRN
Status: DISCONTINUED | OUTPATIENT
Start: 2022-12-17 | End: 2022-12-19 | Stop reason: HOSPADM

## 2022-12-17 RX ORDER — SODIUM CHLORIDE 0.9 % (FLUSH) 0.9 %
5-40 SYRINGE (ML) INJECTION EVERY 12 HOURS SCHEDULED
Status: DISCONTINUED | OUTPATIENT
Start: 2022-12-17 | End: 2022-12-19 | Stop reason: HOSPADM

## 2022-12-17 RX ORDER — LIDOCAINE HYDROCHLORIDE 10 MG/ML
INJECTION, SOLUTION INFILTRATION; PERINEURAL
Status: COMPLETED
Start: 2022-12-17 | End: 2022-12-19

## 2022-12-17 RX ORDER — DOCUSATE SODIUM 100 MG/1
100 CAPSULE, LIQUID FILLED ORAL 2 TIMES DAILY
Status: DISCONTINUED | OUTPATIENT
Start: 2022-12-17 | End: 2022-12-19 | Stop reason: SDUPTHER

## 2022-12-17 RX ORDER — MISOPROSTOL 200 UG/1
800 TABLET ORAL PRN
Status: DISCONTINUED | OUTPATIENT
Start: 2022-12-17 | End: 2022-12-19 | Stop reason: HOSPADM

## 2022-12-17 RX ORDER — SODIUM CHLORIDE 9 MG/ML
25 INJECTION, SOLUTION INTRAVENOUS PRN
Status: DISCONTINUED | OUTPATIENT
Start: 2022-12-17 | End: 2022-12-19 | Stop reason: HOSPADM

## 2022-12-17 RX ORDER — SODIUM CHLORIDE, SODIUM LACTATE, POTASSIUM CHLORIDE, AND CALCIUM CHLORIDE .6; .31; .03; .02 G/100ML; G/100ML; G/100ML; G/100ML
1000 INJECTION, SOLUTION INTRAVENOUS PRN
Status: DISCONTINUED | OUTPATIENT
Start: 2022-12-17 | End: 2022-12-19 | Stop reason: HOSPADM

## 2022-12-17 RX ORDER — SODIUM CHLORIDE 0.9 % (FLUSH) 0.9 %
5-40 SYRINGE (ML) INJECTION PRN
Status: DISCONTINUED | OUTPATIENT
Start: 2022-12-17 | End: 2022-12-19 | Stop reason: HOSPADM

## 2022-12-17 RX ORDER — SODIUM CHLORIDE, SODIUM LACTATE, POTASSIUM CHLORIDE, CALCIUM CHLORIDE 600; 310; 30; 20 MG/100ML; MG/100ML; MG/100ML; MG/100ML
INJECTION, SOLUTION INTRAVENOUS CONTINUOUS
Status: DISCONTINUED | OUTPATIENT
Start: 2022-12-17 | End: 2022-12-21 | Stop reason: HOSPADM

## 2022-12-17 RX ADMIN — SODIUM CHLORIDE, POTASSIUM CHLORIDE, SODIUM LACTATE AND CALCIUM CHLORIDE: 600; 310; 30; 20 INJECTION, SOLUTION INTRAVENOUS at 21:30

## 2022-12-17 RX ADMIN — Medication 2 MILLI-UNITS/MIN: at 22:18

## 2022-12-18 ENCOUNTER — ANESTHESIA (OUTPATIENT)
Dept: LABOR AND DELIVERY | Age: 32
DRG: 560 | End: 2022-12-18
Payer: MEDICAID

## 2022-12-18 ENCOUNTER — ANESTHESIA EVENT (OUTPATIENT)
Dept: LABOR AND DELIVERY | Age: 32
DRG: 560 | End: 2022-12-18
Payer: MEDICAID

## 2022-12-18 PROCEDURE — 6370000000 HC RX 637 (ALT 250 FOR IP): Performed by: OBSTETRICS & GYNECOLOGY

## 2022-12-18 PROCEDURE — 2500000003 HC RX 250 WO HCPCS: Performed by: ANESTHESIOLOGY

## 2022-12-18 PROCEDURE — 3700000025 EPIDURAL BLOCK: Performed by: ANESTHESIOLOGY

## 2022-12-18 PROCEDURE — 2580000003 HC RX 258: Performed by: OBSTETRICS & GYNECOLOGY

## 2022-12-18 PROCEDURE — 1220000001 HC SEMI PRIVATE L&D R&B

## 2022-12-18 RX ORDER — ONDANSETRON 2 MG/ML
4 INJECTION INTRAMUSCULAR; INTRAVENOUS EVERY 6 HOURS PRN
Status: DISCONTINUED | OUTPATIENT
Start: 2022-12-18 | End: 2022-12-19 | Stop reason: HOSPADM

## 2022-12-18 RX ORDER — NALOXONE HYDROCHLORIDE 0.4 MG/ML
INJECTION, SOLUTION INTRAMUSCULAR; INTRAVENOUS; SUBCUTANEOUS PRN
Status: DISCONTINUED | OUTPATIENT
Start: 2022-12-18 | End: 2022-12-19 | Stop reason: HOSPADM

## 2022-12-18 RX ADMIN — SODIUM CHLORIDE, POTASSIUM CHLORIDE, SODIUM LACTATE AND CALCIUM CHLORIDE: 600; 310; 30; 20 INJECTION, SOLUTION INTRAVENOUS at 21:31

## 2022-12-18 RX ADMIN — SODIUM CHLORIDE, POTASSIUM CHLORIDE, SODIUM LACTATE AND CALCIUM CHLORIDE: 600; 310; 30; 20 INJECTION, SOLUTION INTRAVENOUS at 09:46

## 2022-12-18 RX ADMIN — Medication 8 ML: at 23:30

## 2022-12-18 RX ADMIN — Medication 5 ML: at 23:36

## 2022-12-18 RX ADMIN — DOCUSATE SODIUM 100 MG: 100 CAPSULE, LIQUID FILLED ORAL at 21:31

## 2022-12-18 RX ADMIN — Medication 15 ML/HR: at 23:37

## 2022-12-18 ASSESSMENT — PAIN DESCRIPTION - DESCRIPTORS
DESCRIPTORS: CRAMPING;DISCOMFORT
DESCRIPTORS: CRAMPING;DISCOMFORT
DESCRIPTORS: DISCOMFORT;CRAMPING
DESCRIPTORS: CRAMPING;DISCOMFORT

## 2022-12-18 ASSESSMENT — LIFESTYLE VARIABLES: SMOKING_STATUS: 1

## 2022-12-18 ASSESSMENT — ENCOUNTER SYMPTOMS: SHORTNESS OF BREATH: 0

## 2022-12-18 NOTE — PROGRESS NOTES
Updated Dr Holden Wong on patient arrival. Notified GBS negative. Sve on admission 1/70/-2. Notified of prolonged deceleration at 2140, 3 min. Fhts recovered to 125 bpm with reposition change to left side and IV fluid bolus. Orders to start patient on pitocin. Orders to start pitocin at 2 sarah-units and to not increase pitocin; continuous rate of 2 sarah-units throughout the night.

## 2022-12-18 NOTE — ANESTHESIA PRE PROCEDURE
Department of Anesthesiology  Preprocedure Note       Name:  Max Bernstein   Age:  29 y.o.  :  1990                                          MRN:  [id number]         Date:  2022      Surgeon: * No surgeons listed *    Procedure: * No procedures listed *    Medications prior to admission:   Prior to Admission medications    Medication Sig Start Date End Date Taking?  Authorizing Provider   loratadine (CLARITIN) 10 MG tablet Take 10 mg by mouth daily  Patient not taking: Reported on 2022    Historical Provider, MD   Prenatal Vit-Fe Fumarate-FA (PRENATAL VITAMIN PO) Take 1 tablet by mouth daily    Historical Provider, MD       Current medications:    Current Facility-Administered Medications   Medication Dose Route Frequency Provider Last Rate Last Admin    lactated ringers infusion   IntraVENous Continuous Perla Wright  mL/hr at 22 New Bag at 22    lactated ringers bolus  500 mL IntraVENous PRN Perla Wright MD        Or    lactated ringers bolus  1,000 mL IntraVENous PRN Perla Wright MD        sodium chloride flush 0.9 % injection 5-40 mL  5-40 mL IntraVENous 2 times per day Perla Wright MD        sodium chloride flush 0.9 % injection 5-40 mL  5-40 mL IntraVENous PRN Perla Wright MD        0.9 % sodium chloride infusion  25 mL IntraVENous PRN Perla Wright MD        ondansetron [facility]) injection 4 mg  4 mg IntraVENous Q6H PRN Perla Wright MD        methylergonovine (METHERGINE) injection 200 mcg  200 mcg IntraMUSCular PRN Perla Wright MD        carboprost (HEMABATE) injection 250 mcg  250 mcg IntraMUSCular PRN Perla Wright MD        miSOPROStol (CYTOTEC) tablet 800 mcg  800 mcg Rectal PRN Perla Wright MD        tranexamic acid (CYKLOKAPRON) 1,000 mg in sodium chloride 0.9 % 100 mL IVPB  1,000 mg IntraVENous Once PRN Perla Wright MD        acetaminophen (TYLENOL) tablet 650 mg  650 mg Oral Q4H PRN Jessica Chavez MD        benzocaine-menthol (DERMOPLAST) 20-0.5 % spray   Topical PRN Jessica Chavez MD        docusate sodium (COLACE) capsule 100 mg  100 mg Oral BID Jessica Chavez MD        oxytocin (PITOCIN) 15 units in 250 mL infusion  1-20 sarah-units/min IntraVENous Continuous Jessica Chavez MD 2 mL/hr at 22 0107 2 sarah-units/min at 22 0107    povidone-iodine (BETADINE) 10 % external solution             lidocaine 1 % injection                Allergies:  No Known Allergies    Problem List:    Patient Active Problem List   Diagnosis Code    Fetal growth problem suspected but not found Z03.74    Supervision of low-risk pregnancy, third trimester Z34.93    Pregnancy with 44 completed weeks gestation Z3A.39    39 weeks gestation of pregnancy Z3A.39       Past Medical History:        Diagnosis Date    Trauma     previous relationship       Past Surgical History:        Procedure Laterality Date    CYST INCISION AND DRAINAGE      from tailbone    INDUCED              Social History:    Social History     Tobacco Use    Smoking status: Former     Types: Cigarettes     Quit date: 2019     Years since quitting: 3.9    Smokeless tobacco: Never   Substance Use Topics    Alcohol use: Not Currently     Comment: social                                Counseling given: Not Answered      Vital Signs (Current):   Vitals:    22 0121 22 0152 22 0251 22 0321   BP: 129/70 126/73 124/71 119/74   Pulse: 79 72 76 80   Resp:       Temp:       TempSrc:       SpO2:       Weight:       Height:                                                  BP Readings from Last 3 Encounters:   22 119/74   22 127/87   22 (!) 126/90       NPO Status:                                                                                 BMI:   Wt Readings from Last 3 Encounters:   22 220 lb (99.8 kg)   22 220 lb (99.8 kg)   22 215 lb 14.4 oz (97.9 kg)     Body mass index is 38.97 kg/m². CBC:   Lab Results   Component Value Date/Time    WBC 8.8 12/17/2022 09:20 PM    RBC 4.04 12/17/2022 09:20 PM    HGB 11.5 12/17/2022 09:20 PM    HCT 34.7 12/17/2022 09:20 PM    MCV 85.9 12/17/2022 09:20 PM    RDW 14.6 12/17/2022 09:20 PM     12/17/2022 09:20 PM       CMP:   Lab Results   Component Value Date/Time     05/20/2022 02:10 PM    K 3.8 05/20/2022 02:10 PM     05/20/2022 02:10 PM    CO2 23 05/20/2022 02:10 PM    BUN 7 05/20/2022 02:10 PM    CREATININE 0.5 05/20/2022 02:10 PM    GFRAA >60 05/20/2022 02:10 PM    LABGLOM >60 05/20/2022 02:10 PM    GLUCOSE 129 09/19/2022 09:33 AM    GLUCOSE 90 05/20/2022 02:10 PM    PROT 7.7 05/20/2022 02:10 PM    CALCIUM 9.3 05/20/2022 02:10 PM    BILITOT 0.4 05/20/2022 02:10 PM    ALKPHOS 65 05/20/2022 02:10 PM    AST 17 05/20/2022 02:10 PM    ALT 15 05/20/2022 02:10 PM       POC Tests: No results for input(s): POCGLU, POCNA, POCK, POCCL, POCBUN, POCHEMO, POCHCT in the last 72 hours. Coags: No results found for: PROTIME, INR, APTT    HCG (If Applicable): No results found for: PREGTESTUR, PREGSERUM, HCG, HCGQUANT     ABGs: No results found for: PHART, PO2ART, FPL8HSV, WDI1OUT, BEART, X6SQIHPT     Type & Screen (If Applicable):  No results found for: LABABO, LABRH    Drug/Infectious Status (If Applicable):  No results found for: HIV, HEPCAB    COVID-19 Screening (If Applicable): No results found for: COVID19        Anesthesia Evaluation  Patient summary reviewed and Nursing notes reviewed no history of anesthetic complications (denies self and family hx): Airway: Mallampati: III  TM distance: >3 FB   Neck ROM: full  Mouth opening: > = 3 FB   Dental:          Pulmonary: breath sounds clear to auscultation  (+) current smoker (Vapes)    (-) shortness of breath          Patient smoked on day of surgery.                 ROS comment: vape daily    Cardiovascular:Negative CV ROS            Rhythm: regular  Rate: normal                    Neuro/Psych:   (+) psychiatric history: stable without treatmentdepression/anxiety              ROS comment: scoliosis GI/Hepatic/Renal:   (+) GERD (tums): poorly controlled,          ROS comment: Cyst  8 years   D/c. Endo/Other: Negative Endo/Other ROS                    Abdominal:   (+) obese,           Vascular: negative vascular ROS. Other Findings:           Anesthesia Plan      general, spinal and epidural     ASA 2     (Risks and benefits discussed agree to proceed)        Anesthetic plan and risks discussed with patient (boyfriend). Use of blood products discussed with patient whom consented to blood products. Plan discussed with attending.                     Emmett Curtis, FRANCES - CRNA   12/18/2022

## 2022-12-18 NOTE — H&P
Department of Obstetrics and Gynecology  Attending Obstetrics History and Physical        CHIEF COMPLAINT:  Here for elective IOL    HISTORY OF PRESENT ILLNESS:      The patient is a 29 y.o.  4 parity 0 at 39.6 weeks. Patient presents with a chief complaint as above and is being admitted for elective IOL      OB History    Para Term  AB Living   4 0 0   3     SAB IAB Ectopic Molar Multiple Live Births   2 1              # Outcome Date GA Lbr Thierry/2nd Weight Sex Delivery Anes PTL Lv   4 Current            3 IAB            2 SAB            1 SAB                  Past Medical History:        Diagnosis Date    Trauma     previous relationship     Past Surgical History:        Procedure Laterality Date    CYST INCISION AND DRAINAGE      from tailbone    INDUCED            Social History:    TOBACCO:   reports that she quit smoking about 3 years ago. Her smoking use included cigarettes. She has never used smokeless tobacco.  ETOH:   reports that she does not currently use alcohol. DRUGS:   reports no history of drug use. Family History:       Problem Relation Age of Onset    Diabetes Maternal Aunt     Breast Cancer Maternal Grandmother      Medications Prior to Admission:  Medications Prior to Admission: loratadine (CLARITIN) 10 MG tablet, Take 10 mg by mouth daily (Patient not taking: Reported on 2022)  Prenatal Vit-Fe Fumarate-FA (PRENATAL VITAMIN PO), Take 1 tablet by mouth daily  Allergies:  Patient has no known allergies.       PHYSICAL EXAM:    General appearance:  awake, alert, cooperative, no apparent distress, and appears stated age  Neurologic:  Normal DTRs  Lungs:  No increased work of breathing, good air exchange, clear to auscultation bilaterally, no crackles or wheezing  Heart:  Normal apical impulse, regular rate and rhythm, normal S1 and S2, no S3 or S4, and no murmur noted  Abdomen:  Gravid, soft non-tender  Fetal heart rate:  Baseline Heart Rate 145, accelerations: present    Cervix:    DILATION:  1 cm  EFFACEMENT:   50%  STATION:  -3 cm    Contraction frequency:  0 minutes  Membranes:  Intact      ASSESSMENT AND PLAN:    The patient is a 29 y.o.  3 parity 0 at 39.6 weeks  Elective MD Nilsa Lind MD  2022

## 2022-12-18 NOTE — PROGRESS NOTES
Comfortable  Bp= 120/74  Cx 2cm/50/-2 vtx  Gbs neg  Fht's reactive  Irregular ctx's  Pitocin at 2mu/min  Nurse instructed to increase pitocin per protocol

## 2022-12-18 NOTE — PROGRESS NOTES
, 39.6 presents to unit for scheduled iol. Pt denies lof, denies vb, states +FM, states occassional cramping. Pt reports no significant history. Efm applied. VSS.

## 2022-12-19 PROBLEM — Z3A.40 40 WEEKS GESTATION OF PREGNANCY: Status: ACTIVE | Noted: 2022-12-19

## 2022-12-19 PROCEDURE — 59409 OBSTETRICAL CARE: CPT | Performed by: OBSTETRICS & GYNECOLOGY

## 2022-12-19 PROCEDURE — 7200000001 HC VAGINAL DELIVERY

## 2022-12-19 PROCEDURE — 1220000000 HC SEMI PRIVATE OB R&B

## 2022-12-19 PROCEDURE — 6370000000 HC RX 637 (ALT 250 FOR IP): Performed by: OBSTETRICS & GYNECOLOGY

## 2022-12-19 PROCEDURE — 3E033VJ INTRODUCTION OF OTHER HORMONE INTO PERIPHERAL VEIN, PERCUTANEOUS APPROACH: ICD-10-PCS | Performed by: OBSTETRICS & GYNECOLOGY

## 2022-12-19 PROCEDURE — 2500000003 HC RX 250 WO HCPCS

## 2022-12-19 PROCEDURE — 10907ZC DRAINAGE OF AMNIOTIC FLUID, THERAPEUTIC FROM PRODUCTS OF CONCEPTION, VIA NATURAL OR ARTIFICIAL OPENING: ICD-10-PCS | Performed by: OBSTETRICS & GYNECOLOGY

## 2022-12-19 PROCEDURE — 6360000002 HC RX W HCPCS

## 2022-12-19 PROCEDURE — 0HQ9XZZ REPAIR PERINEUM SKIN, EXTERNAL APPROACH: ICD-10-PCS | Performed by: OBSTETRICS & GYNECOLOGY

## 2022-12-19 RX ORDER — SODIUM CHLORIDE 0.9 % (FLUSH) 0.9 %
5-40 SYRINGE (ML) INJECTION EVERY 12 HOURS SCHEDULED
Status: DISCONTINUED | OUTPATIENT
Start: 2022-12-19 | End: 2022-12-21 | Stop reason: HOSPADM

## 2022-12-19 RX ORDER — IBUPROFEN 600 MG/1
600 TABLET ORAL EVERY 6 HOURS PRN
Status: DISCONTINUED | OUTPATIENT
Start: 2022-12-19 | End: 2022-12-21 | Stop reason: HOSPADM

## 2022-12-19 RX ORDER — SODIUM CHLORIDE, SODIUM LACTATE, POTASSIUM CHLORIDE, CALCIUM CHLORIDE 600; 310; 30; 20 MG/100ML; MG/100ML; MG/100ML; MG/100ML
INJECTION, SOLUTION INTRAVENOUS CONTINUOUS
Status: DISCONTINUED | OUTPATIENT
Start: 2022-12-19 | End: 2022-12-21 | Stop reason: HOSPADM

## 2022-12-19 RX ORDER — SODIUM CHLORIDE 0.9 % (FLUSH) 0.9 %
5-40 SYRINGE (ML) INJECTION PRN
Status: DISCONTINUED | OUTPATIENT
Start: 2022-12-19 | End: 2022-12-21 | Stop reason: HOSPADM

## 2022-12-19 RX ORDER — MODIFIED LANOLIN
OINTMENT (GRAM) TOPICAL PRN
Status: DISCONTINUED | OUTPATIENT
Start: 2022-12-19 | End: 2022-12-21 | Stop reason: HOSPADM

## 2022-12-19 RX ORDER — DOCUSATE SODIUM 100 MG/1
100 CAPSULE, LIQUID FILLED ORAL 2 TIMES DAILY
Status: DISCONTINUED | OUTPATIENT
Start: 2022-12-19 | End: 2022-12-21 | Stop reason: HOSPADM

## 2022-12-19 RX ORDER — SODIUM CHLORIDE 9 MG/ML
INJECTION, SOLUTION INTRAVENOUS PRN
Status: DISCONTINUED | OUTPATIENT
Start: 2022-12-19 | End: 2022-12-21 | Stop reason: HOSPADM

## 2022-12-19 RX ADMIN — Medication: at 05:01

## 2022-12-19 RX ADMIN — IBUPROFEN 600 MG: 600 TABLET, FILM COATED ORAL at 21:19

## 2022-12-19 RX ADMIN — IBUPROFEN 600 MG: 600 TABLET, FILM COATED ORAL at 14:08

## 2022-12-19 RX ADMIN — Medication 166.7 ML: at 02:12

## 2022-12-19 RX ADMIN — Medication 20 MILLI-UNITS/MIN: at 01:24

## 2022-12-19 RX ADMIN — LIDOCAINE HYDROCHLORIDE: 10 INJECTION, SOLUTION INFILTRATION; PERINEURAL at 02:09

## 2022-12-19 RX ADMIN — IBUPROFEN 600 MG: 600 TABLET, FILM COATED ORAL at 04:37

## 2022-12-19 RX ADMIN — DOCUSATE SODIUM 100 MG: 100 CAPSULE, LIQUID FILLED ORAL at 10:09

## 2022-12-19 RX ADMIN — DOCUSATE SODIUM 100 MG: 100 CAPSULE, LIQUID FILLED ORAL at 21:19

## 2022-12-19 ASSESSMENT — PAIN - FUNCTIONAL ASSESSMENT
PAIN_FUNCTIONAL_ASSESSMENT: ACTIVITIES ARE NOT PREVENTED
PAIN_FUNCTIONAL_ASSESSMENT: ACTIVITIES ARE NOT PREVENTED

## 2022-12-19 ASSESSMENT — PAIN SCALES - GENERAL
PAINLEVEL_OUTOF10: 2
PAINLEVEL_OUTOF10: 0
PAINLEVEL_OUTOF10: 0
PAINLEVEL_OUTOF10: 2
PAINLEVEL_OUTOF10: 3

## 2022-12-19 ASSESSMENT — PAIN DESCRIPTION - PAIN TYPE: TYPE: ACUTE PAIN

## 2022-12-19 ASSESSMENT — PAIN DESCRIPTION - DESCRIPTORS
DESCRIPTORS: DISCOMFORT
DESCRIPTORS: DISCOMFORT
DESCRIPTORS: CRAMPING;DISCOMFORT

## 2022-12-19 ASSESSMENT — PAIN DESCRIPTION - ORIENTATION
ORIENTATION: LOWER
ORIENTATION: LOWER;MID

## 2022-12-19 ASSESSMENT — PAIN DESCRIPTION - LOCATION
LOCATION: ABDOMEN
LOCATION: ABDOMEN
LOCATION: VAGINA

## 2022-12-19 ASSESSMENT — PAIN DESCRIPTION - FREQUENCY: FREQUENCY: INTERMITTENT

## 2022-12-19 ASSESSMENT — PAIN DESCRIPTION - ONSET: ONSET: GRADUAL

## 2022-12-19 NOTE — PROGRESS NOTES
Dr Marline Marquez at bedside.  of viable baby girl at 65. Delayed cord clamping completed. Skin to skin initiated immediately after delivery. APGARS 9/9.

## 2022-12-19 NOTE — LACTATION NOTE
Mom reports baby nursed well after delivery, sleepy now. Encouraged skin to skin and frequent attempts at breast to stimulate milk production. Instructed on normal infant behavior in the first 12-24 hours and importance of stimulating the baby frequently to eat during this time. Reviewed hand expression, and encouraged to hand express drops of colostrum when baby is sleepy. Instructed that baby may also feed 8-12 times a day- cluster feeding at times- as her milk supply is being established. Instructed on benefits of skin to skin and avoidance of pacifier / artificial nipple use until breastfeeding is well established. Educated on making sure infant has an open airway while breastfeeding and skin to skin. Instructed on hunger cues and waking techniques to try. Reviewed signs of adequate I & O; allow baby to feed ad griselda and not to limit time at breast. Breastfeeding booklet provided with review of its contents. Encouraged to call with any concerns. Mom has a breast pump for home use.

## 2022-12-19 NOTE — PROGRESS NOTES
Notified Dr Michelle Prince that patient is now maxed out on pitocin at 20 sarah-units. Sve 3/70/-2. Patient is now feeling her contractions, but states does not want an epidural at this time. Orders to continue plan of care.

## 2022-12-19 NOTE — PROGRESS NOTES
Assumed patient care. VSS. Pt denies lof, denies vb, states +FM. Patient states can feel her contractions, but is not reporting any pain at this time. Patient currently bouncing on birthing ball. Updated Dr Jacky Johns on patient. Notified patient is on 14 sarah-units of pitocin, feeling contractions q2-4min but no reports of pain at this time. Orders to continue plan of care.

## 2022-12-19 NOTE — PROGRESS NOTES
Patient up to bathroom. Voided. Brittany care completed. Patient tolerated well. Transfer to mom/baby.

## 2022-12-19 NOTE — PROGRESS NOTES
Patient off efm for epidural placement from [id number]. Efm re-applied, blood pressures cycling, ECG and pulse ox continues. Call light in reach.

## 2022-12-19 NOTE — ANESTHESIA POSTPROCEDURE EVALUATION
Department of Anesthesiology  Postprocedure Note    Patient: Katherine Mancia  MRN: [id number]  YOB: 1990  Date of evaluation: 12/19/2022      Procedure Summary     Date: 12/18/22 Room / Location:     Anesthesia Start: 2318 Anesthesia Stop: 12/19/22 0208    Procedure: Labor Analgesia Diagnosis:     Scheduled Providers:  Responsible Provider: Sigifredo Carter MD    Anesthesia Type: general, spinal, epidural ASA Status: 2          Anesthesia Type: No value filed.     Khari Phase I:      Khari Phase II:        Anesthesia Post Evaluation    Patient location during evaluation: bedside  Patient participation: complete - patient participated  Level of consciousness: awake and alert  Pain score: 0  Airway patency: patent  Nausea & Vomiting: no nausea and no vomiting  Complications: no  Cardiovascular status: blood pressure returned to baseline and hemodynamically stable  Respiratory status: acceptable  Hydration status: stable

## 2022-12-19 NOTE — PROGRESS NOTES
[facility] is a 47-year-old G4, P0 female at 40 weeks presented yesterday for IOL. She was started on Pitocin last night and was kept at 2 units throughout the evening this morning the Pitocin was increased and she is feeling slightly more contractions now.   Cervix is now 350-1 AROM at 1948 clear fluid  Category 1 tracing  Irregular contractions  Pain management as indicated epidural when desired

## 2022-12-19 NOTE — L&D DELIVERY NOTE
of a female infant at 0 in the KRISTI position and placed on mom's abdomen. .   After 1 minute the cord was clamped and cut by dad. Mouth and nares suctioned on the abdomen. A sample of cord blood was obtained to be sent to lab. The placenta delivered spontaneously intact. First-degree laceration to the left sulcus was repaired with interrupted sutures using 1% lidocaine and 3-0 Vicryl.   good hemostasis. bilateral periurethral tears were not bleeding and were not repaired. EBL less than 500. Apgars were 9 and 9 weight is pending. Mom plans on breast-feeding. Mom and baby recovering stable.

## 2022-12-19 NOTE — PLAN OF CARE
Problem: Vaginal Birth or  Section  Goal: Fetal and maternal status remain reassuring during the birth process  Outcome: Progressing     Problem: Pain  Goal: Verbalizes/displays adequate comfort level or baseline comfort level  Outcome: Progressing  Flowsheets (Taken 2022 895)  Verbalizes/displays adequate comfort level or baseline comfort level:   Encourage patient to monitor pain and request assistance   Assess pain using appropriate pain scale   Administer analgesics based on type and severity of pain and evaluate response   Implement non-pharmacological measures as appropriate and evaluate response     Problem: Safety - Adult  Goal: Free from fall injury  Outcome: Progressing     Problem: Discharge Planning  Goal: Discharge to home or other facility with appropriate resources  Outcome: Progressing     Problem: Postpartum  Goal: Experiences normal postpartum course  Outcome: Progressing  Goal: Appropriate maternal -  bonding  Outcome: Progressing  Goal: Establishment of infant feeding pattern  Outcome: Progressing     Problem: Infection - Adult  Goal: Absence of infection at discharge  Outcome: Progressing  Goal: Absence of infection during hospitalization  Outcome: Progressing  Goal: Absence of fever/infection during anticipated neutropenic period  Outcome: Progressing

## 2022-12-19 NOTE — PROGRESS NOTES
Dr Roxana Cuadra at bedside to discuss plan of care with patient. AROM at 1948 clear fluid, moderate amount. Patient feeling contractions, but denies anything for pain at this time. Verbal orders received for epidural when patient requests.

## 2022-12-19 NOTE — PROGRESS NOTES
Critical Care History & Physical    Date of consult: 22    Referring Physician  Baljinder George M.D.    Reason for Consultation  Chief Complaint   Patient presents with   • Fatigue     SInce this afternoon at a . Pt falling asleep during triage, arousable to painful stimuli. Pt oriented x 4 when awake.    • N/V     This afternoon. Hx T2 DM. FSBS in triage 230       History of Presenting Illness  59 y.o. female with no known past medical history, does not take medications, presents to the emergency department due to sudden onset syncope, altered mentation after attending her family member's  today.  Arrived to the ED with systolic blood pressure ~260 and stat head CT revealed a large right cerebellar hemorrhage with mass-effect and effacement of the ventricle.  Neurology and neurosurgery were consulted and she was taken to the OR emergently for decompressive craniectomy.      Code Status  Full Code    Review of Systems  Review of Systems   Unable to perform ROS: Acuity of condition       Past Medical History   has no past medical history on file.    Surgical History   has no past surgical history on file.    Family History  Reviewed and not pertinent    Social History   reports that she has never smoked. She has never used smokeless tobacco. She reports that she does not drink alcohol and does not use drugs.    Medications  Home Medications     Reviewed by Lynn Chawla R.N. (Registered Nurse) on 22 at 1721  Med List Status: Complete   Medication Last Dose Status   acetaminophen (TYLENOL) suppository 650 mg  Active   acetaminophen (Tylenol) tablet 650 mg  Active   bisacodyl (DULCOLAX) suppository 10 mg  Active   ceFAZolin (ANCEF) injection  Active   dexamethasone (DECADRON) injection  Active   fentaNYL (SUBLIMAZE) injection  Active   hydrALAZINE (APRESOLINE) injection 20 mg  Active   labetalol (NORMODYNE/TRANDATE) injection 10 mg  Active   Lactated Ringers  Active   lidocaine PF  Dr Kiera Sales on unit. Notified patient is comfortable with epidural. Sve 3/90/-1. Notified fhts had late decelerations and variables. Orders to continue plan of care. (XYLOCAINE-MPF) 2 % injection PF  Active   magnesium hydroxide (MILK OF MAGNESIA) suspension 30 mL  Active   MD Alert...ICU Electrolyte Replacement per Pharmacy  Active   midazolam (VERSED) injection  Active   midazolam (Versed) injection 5 mg  Active   niCARdipine (CARDENE) 25 mg in  mL Infusion  Active   ondansetron (ZOFRAN ODT) dispertab 4 mg  Active   ondansetron (ZOFRAN) syringe/vial injection 4 mg  Active   phenylephrine (DOROTHY-SYNEPHRINE) injection  Active   polyethylene glycol/lytes (MIRALAX) PACKET 1 Packet  Active   propofol (DIPRIVAN) injection  Active   Respiratory Therapy Consult  Active   rocuronium (ZEMURON) injection  Active   senna-docusate (PERICOLACE or SENOKOT S) 8.6-50 MG per tablet 2 Tablet  Active                Allergies  No Known Allergies      Vital Signs last 24 hours  Temp:  [36.1 °C (97 °F)-36.2 °C (97.1 °F)] 36.1 °C (97 °F)  Pulse:  [58-79] 74  Resp:  [12-29] 29  BP: (134-259)/() 150/72  SpO2:  [92 %-98 %] 96 %      Physical Exam  Physical Exam  Vitals and nursing note reviewed. Exam conducted with a chaperone present.   HENT:      Head: Normocephalic.      Mouth/Throat:      Mouth: Mucous membranes are moist.   Eyes:      Conjunctiva/sclera: Conjunctivae normal.   Cardiovascular:      Rate and Rhythm: Normal rate and regular rhythm.      Pulses: Normal pulses.   Pulmonary:      Effort: Pulmonary effort is normal. No respiratory distress.   Abdominal:      General: There is no distension.      Palpations: Abdomen is soft.      Tenderness: There is no abdominal tenderness.   Musculoskeletal:         General: Normal range of motion.      Cervical back: Normal range of motion. No rigidity.   Skin:     General: Skin is warm.      Capillary Refill: Capillary refill takes less than 2 seconds.   Neurological:      Mental Status: She is easily aroused. She is lethargic.      Motor: No weakness.      Comments: Follows commands  Speech is slurred  Frequently falls into somnolence            Fluids    Intake/Output Summary (Last 24 hours) at 8/2/2022 1725  Last data filed at 8/2/2022 1724  Gross per 24 hour   Intake 500 ml   Output 1100 ml   Net -600 ml         Laboratory  Recent Results (from the past 48 hour(s))   POCT glucose device results    Collection Time: 08/02/22  1:49 PM   Result Value Ref Range    POC Glucose, Blood 230 (H) 65 - 99 mg/dL   CBC WITH DIFFERENTIAL    Collection Time: 08/02/22  2:07 PM   Result Value Ref Range    WBC 14.1 (H) 4.8 - 10.8 K/uL    RBC 5.32 4.20 - 5.40 M/uL    Hemoglobin 15.8 12.0 - 16.0 g/dL    Hematocrit 47.6 (H) 37.0 - 47.0 %    MCV 89.5 81.4 - 97.8 fL    MCH 29.7 27.0 - 33.0 pg    MCHC 33.2 (L) 33.6 - 35.0 g/dL    RDW 40.7 35.9 - 50.0 fL    Platelet Count 264 164 - 446 K/uL    MPV 10.6 9.0 - 12.9 fL    Neutrophils-Polys 71.00 44.00 - 72.00 %    Lymphocytes 17.50 (L) 22.00 - 41.00 %    Monocytes 2.60 0.00 - 13.40 %    Eosinophils 1.80 0.00 - 6.90 %    Basophils 0.00 0.00 - 1.80 %    Nucleated RBC 0.00 /100 WBC    Neutrophils (Absolute) 10.50 (H) 2.00 - 7.15 K/uL    Lymphs (Absolute) 2.47 1.00 - 4.80 K/uL    Monos (Absolute) 0.37 0.00 - 0.85 K/uL    Eos (Absolute) 0.25 0.00 - 0.51 K/uL    Baso (Absolute) 0.00 0.00 - 0.12 K/uL    NRBC (Absolute) 0.00 K/uL   COMP METABOLIC PANEL    Collection Time: 08/02/22  2:07 PM   Result Value Ref Range    Sodium 142 135 - 145 mmol/L    Potassium 3.1 (L) 3.6 - 5.5 mmol/L    Chloride 103 96 - 112 mmol/L    Co2 23 20 - 33 mmol/L    Anion Gap 16.0 7.0 - 16.0    Glucose 247 (H) 65 - 99 mg/dL    Bun 8 8 - 22 mg/dL    Creatinine 0.53 0.50 - 1.40 mg/dL    Calcium 8.9 8.5 - 10.5 mg/dL    AST(SGOT) 23 12 - 45 U/L    ALT(SGPT) 22 2 - 50 U/L    Alkaline Phosphatase 118 (H) 30 - 99 U/L    Total Bilirubin 0.5 0.1 - 1.5 mg/dL    Albumin 5.0 (H) 3.2 - 4.9 g/dL    Total Protein 8.6 (H) 6.0 - 8.2 g/dL    Globulin 3.6 (H) 1.9 - 3.5 g/dL    A-G Ratio 1.4 g/dL   TROPONIN    Collection Time: 08/02/22  2:07 PM   Result Value Ref Range     Troponin T <6 6 - 19 ng/L   APTT    Collection Time: 22  2:07 PM   Result Value Ref Range    APTT 25.8 24.7 - 36.0 sec   PROTHROMBIN TIME (INR)    Collection Time: 22  2:07 PM   Result Value Ref Range    PT 11.9 (L) 12.0 - 14.6 sec    INR 0.88 0.87 - 1.13   ESTIMATED GFR    Collection Time: 22  2:07 PM   Result Value Ref Range    GFR (CKD-EPI) 108 >60 mL/min/1.73 m 2   DIFFERENTIAL MANUAL    Collection Time: 22  2:07 PM   Result Value Ref Range    Bands-Stabs 3.50 0.00 - 10.00 %    Metamyelocytes 1.80 %    Myelocytes 1.80 %    Manual Diff Status PERFORMED    PERIPHERAL SMEAR REVIEW    Collection Time: 22  2:07 PM   Result Value Ref Range    Peripheral Smear Review see below    PLATELET ESTIMATE    Collection Time: 22  2:07 PM   Result Value Ref Range    Plt Estimation Normal    MORPHOLOGY    Collection Time: 22  2:07 PM   Result Value Ref Range    RBC Morphology Normal    PLATELET MAPPING WITH BASIC TEG    Collection Time: 22  2:39 PM   Result Value Ref Range    Reaction Time Initial-R 4.6 4.6 - 9.1 min    React Time Initial Hep 5.6 4.3 - 8.3 min    Clot Kinetics-K 1.3 0.8 - 2.1 min    Clot Angle-Angle 72.1 63.0 - 78.0 degrees    Maximum Clot Strength-MA 61.4 52.0 - 69.0 mm    TEG Functional Fibrinogen(MA) 20.5 15.0 - 32.0 mm    Lysis 30 minutes-LY30 0.0 0.0 - 2.6 %    % Inhibition ADP 15.7 0.0 - 17.0 %    % Inhibition AA 6.3 0.0 - 11.0 %    TEG Algorithm Link Algorithm    EKG (NOW)    Collection Time: 22  2:48 PM   Result Value Ref Range    Report       [facility] Emergency Dept.    Test Date:  2022  Pt Name:    ANDREAS SANCHEZ                     Department: ER  MRN:        [id number]                      Room:       [facility]  Gender:     Female                       Technician: [id number]  :        1963                   Requested By:JANET ARELLANO  Order #:    [id number]                    Otilia MD:    Measurements  Intervals                                 Axis  Rate:       69                           P:          8  AR:         181                          QRS:        9  QRSD:       100                          T:          56  QT:         476  QTc:        510    Interpretive Statements  Sinus rhythm  LVH with secondary repolarization abnormality  Anterior ST elevation, probably due to LVH  Borderline prolonged QT interval  No previous ECG available for comparison     POCT glucose device results    Collection Time: 08/02/22  4:21 PM   Result Value Ref Range    POC Glucose, Blood 264 (H) 65 - 99 mg/dL          Imaging  CT-CTA HEAD WITH & W/O-POST PROCESS   Final Result         1. No hemodynamically significant narrowing of the major intracranial vessels.      2. Redemonstration of large parenchymal hemorrhage in the left cerebellum      CT-CTA NECK WITH & W/O-POST PROCESSING   Final Result      1. No evidence of flow-limiting stenosis or dissection in the cervical carotid or cervical vertebral arteries.      CT-HEAD W/O   Final Result      Large 4.6 x 3.4 cm intraparenchymal hematoma centered in the left cerebellar hemisphere. Associated mass effect results in effacement of the basal cisterns and fourth ventricle. No evidence of hydrocephalus.      Findings were discussed with Dr. JANET ARELLANO on 8/2/2022 2:45 PM.      EC-ECHOCARDIOGRAM COMPLETE W/O CONT    (Results Pending)         Assessment/Plan  * Cerebellar hemorrhage, acute (HCC)- (present on admission)  Assessment & Plan  Admit ICU  q1 hour neuro checks  Emergent decompressive craniectomy    SBP <160    PRN:  - Labetalol  - Hydralazine    Nicardipine gtt if necessary    No VTE ppx/antiplatelet   Hypertonic saline if herniation  Sodium is 142  Mannitol 50mg q6 if concern for elevated ICP  Repeat CT head w/out contrast for clinical change  MRI brain w/wo contrast (ich protocol)  MRA brain without contrast  Hold Keppra  Platelet >100k, INR <1.5  Echocardiogram    HOB > 30  degrees  PT/OT/SLP  Avoid lying flat when possible     Hyperglycemia- (present on admission)  Assessment & Plan  A1c  Sliding scale for now    Hypokalemia- (present on admission)  Assessment & Plan  Replete for K of 4      DVT prophylaxis:  Hold due to cerebellar hemorrhage  PUD prophylaxis: N/A  Glycemic control: SSI if required  Nutrition: Strict n.p.o.  Lines: None  Archer: We will place    Discussed patient condition and risk of morbidity and/or mortality with Family, RN, RT, Pharmacy, Code status disscussed, Charge nurse / hot rounds, Patient and neurology and neurosurgery.      The patient remains critically ill.  Critical care time = 55 minutes in directly providing and coordinating critical care and extensive data review.  No time overlap and excludes procedures.

## 2022-12-19 NOTE — CARE COORDINATION
SW Discharge planning     SW called [facility] ( [phone])  and spoke with , Michelle Myers, who reported that [facility] ( [phone])  will NOT be involved at this time     PLAN    Baby CAN be discharged home when medically ready, children services will NOT be involved at this time.    Electronically signed by ALTAF Chavez on 12/19/2022 at 1:58 PM

## 2022-12-19 NOTE — ANESTHESIA PROCEDURE NOTES
Epidural Block    Patient location during procedure: OB  Start time: 12/18/2022 11:18 PM  End time: 12/18/2022 11:40 PM  Reason for block: labor epidural  Staffing  Anesthesiologist: Supriya Mariano MD  Resident/CRNA: FRANCES Levy - CRNA  Epidural  Patient position: sitting  Prep: ChloraPrep  Patient monitoring: cardiac monitor, continuous pulse ox and frequent blood pressure checks  Approach: midline  Location: L2-3  Injection technique: CHRIS air  Provider prep: mask and sterile gloves  Needle  Needle type: Tuohy   Needle gauge: 18 G  Needle length: 3.5 in  Needle insertion depth: 6 cm  Catheter type: end hole  Catheter size: 20 G.   Catheter at skin depth: 12 cm  Test dose: negative (7007)Catheter Secured: tegaderm and tape  Assessment  Sensory level: T10  Hemodynamics: stable  Attempts: 1  Outcomes: uncomplicated and patient tolerated procedure well  Preanesthetic Checklist  Completed: patient identified, IV checked, site marked, risks and benefits discussed, surgical/procedural consents, equipment checked, pre-op evaluation, timeout performed, anesthesia consent given, oxygen available and monitors applied/VS acknowledged

## 2022-12-19 NOTE — CARE COORDINATION
SW Discharge Planning   SW received consult for \" (+ MJ) 05/17/22) Mother's UDS was negative on 12/17/22)      SUZANNA met with Macarena Vu ( [phone]) first time mother to baby girl Wilene Book ( 12/19/19) and introduced self and role. [facility] reported that she resides at the address listed in the chart with father of the baby, Lashawn Blankenship ( 11/25/89). [facility] stated that she is employed at [employer] and will be adding baby to [place]. Per [employer], prenatal care was with Dr. David Koch, and she is still in the process of choosing a pediatrician. [facility] Reported that she has all needed items including a car seat and pack and play. We discussed safe sleep practices. [facility] was agreeable to a [facility] and [facility] referral. [facility]  denied any past or current history of children services involvement, legal issues, substance abuse aside from [facility], domestic violence or mental health diagnosis. We discussed awareness of Post Partum Depression and encouraged contact with her OB if any problems arise. SUZANNA did address Paulina's positive UDS for THC on 5/17/22, and she reported that she stopped all usage since that date.  [facility] expressed understanding for the need of a [facility] ( [phone])  referral.     SUZANNA completed [facility] ( [phone])  referral to Jessie    Baby can NOT be discharged home until [facility] ( [phone]) provides disposition  SW to continue communication with nursing staff and [facility] ( [phone])      [facility] and [facility] referral were completed       Electronically signed by ALTAF Jones on 12/19/2022 at 9:40 AM

## 2022-12-19 NOTE — PROGRESS NOTES
This RN at bedside to readjust efm. Patient requesting bedpan, patient refusing cervical exam at this time. Patient on bedpan now, unable to adjust efm.

## 2022-12-19 NOTE — FLOWSHEET NOTE
Admitted M/B #306 via w/c from L&D. To bed per self with L&D nurse. Moving legs well. Hep lock intact left forearm. Site clear. Patient states she had Flu and Tdap vaccines and OK's Hep B for baby. Info given on [facility] and 24hr bloodwork for baby and on visitation policy. Nourishment provided. Oriented to room and surroundings. Voices no c/o discomfort. FOB at bedside.

## 2022-12-19 NOTE — PROGRESS NOTES
Patient called out, feels more pressure.     Sve complete/+2      Dr Michelle Alfonso called for delivery

## 2022-12-20 LAB
HCT VFR BLD CALC: 31.1 % (ref 34–48)
HEMOGLOBIN: 9.8 G/DL (ref 11.5–15.5)
MCH RBC QN AUTO: 28.1 PG (ref 26–35)
MCHC RBC AUTO-ENTMCNC: 31.5 % (ref 32–34.5)
MCV RBC AUTO: 89.1 FL (ref 80–99.9)
PDW BLD-RTO: 15 FL (ref 11.5–15)
PLATELET # BLD: 214 E9/L (ref 130–450)
PMV BLD AUTO: 10 FL (ref 7–12)
RBC # BLD: 3.49 E12/L (ref 3.5–5.5)
WBC # BLD: 8.1 E9/L (ref 4.5–11.5)

## 2022-12-20 PROCEDURE — 36415 COLL VENOUS BLD VENIPUNCTURE: CPT

## 2022-12-20 PROCEDURE — 6370000000 HC RX 637 (ALT 250 FOR IP): Performed by: OBSTETRICS & GYNECOLOGY

## 2022-12-20 PROCEDURE — 85027 COMPLETE CBC AUTOMATED: CPT

## 2022-12-20 PROCEDURE — 99024 POSTOP FOLLOW-UP VISIT: CPT | Performed by: NURSE PRACTITIONER

## 2022-12-20 PROCEDURE — 1220000000 HC SEMI PRIVATE OB R&B

## 2022-12-20 RX ADMIN — DOCUSATE SODIUM 100 MG: 100 CAPSULE, LIQUID FILLED ORAL at 08:23

## 2022-12-20 RX ADMIN — IBUPROFEN 600 MG: 600 TABLET, FILM COATED ORAL at 23:50

## 2022-12-20 RX ADMIN — IBUPROFEN 600 MG: 600 TABLET, FILM COATED ORAL at 03:00

## 2022-12-20 RX ADMIN — IBUPROFEN 600 MG: 600 TABLET, FILM COATED ORAL at 08:23

## 2022-12-20 RX ADMIN — DOCUSATE SODIUM 100 MG: 100 CAPSULE, LIQUID FILLED ORAL at 20:53

## 2022-12-20 RX ADMIN — IBUPROFEN 600 MG: 600 TABLET, FILM COATED ORAL at 17:58

## 2022-12-20 ASSESSMENT — PAIN DESCRIPTION - ORIENTATION
ORIENTATION: LOWER

## 2022-12-20 ASSESSMENT — PAIN DESCRIPTION - LOCATION
LOCATION: ABDOMEN
LOCATION: ABDOMEN
LOCATION: ABDOMEN;BACK
LOCATION: ABDOMEN

## 2022-12-20 ASSESSMENT — PAIN SCALES - GENERAL
PAINLEVEL_OUTOF10: 2
PAINLEVEL_OUTOF10: 2
PAINLEVEL_OUTOF10: 1
PAINLEVEL_OUTOF10: 2

## 2022-12-20 ASSESSMENT — PAIN DESCRIPTION - DESCRIPTORS
DESCRIPTORS: DISCOMFORT
DESCRIPTORS: DISCOMFORT;CRAMPING
DESCRIPTORS: DISCOMFORT
DESCRIPTORS: ACHING;CRAMPING;SORE;DISCOMFORT

## 2022-12-20 NOTE — PLAN OF CARE
Problem: Safety - Adult  Goal: Free from fall injury  12/20/2022 0128 by Lizette Kurtz RN  Outcome: Progressing     Problem: Pain  Goal: Verbalizes/displays adequate comfort level or baseline comfort level  12/20/2022 0128 by Lizette Kurtz RN  Outcome: Progressing     Problem: Safety - Adult  Goal: Free from fall injury  12/20/2022 0128 by Lizette Kurtz RN  Outcome: Progressing

## 2022-12-20 NOTE — PROGRESS NOTES
PPD #1    Patient:  Shalonda Reyna     Admit Date:  12/17/2022  9:01 PM  Medical Record Number:  [id number]   Today's Date: 12/20/2022    S: No complaints; tolerating diet: affirms; ambulating well: affirms; voiding without difficulty:  affirms; bm: denies; flatus: affirms; pain meds appropriate: affirms; vaginal bleeding: thin lochia.     O:   Vitals:    12/19/22 1903 12/19/22 2312 12/20/22 0605 12/20/22 0706   BP: 132/70 129/84 126/63 113/62   Pulse: 90 94 90 89   Resp: 16 16 16 18   Temp: 98 °F (36.7 °C) 98.3 °F (36.8 °C) 97.5 °F (36.4 °C) 98.7 °F (37.1 °C)   TempSrc: Oral Oral Oral Oral   SpO2: 98% 99% 98% 98%   Weight:       Height:         Gen: A&O, cooperative, pleasant   Heart: RRR   Lungs: CTA b/l   Abd; soft, NT, non distended, +BS  Back: no CVA or paraspinal tenderness   Ext: No clubbing, cyanosis, edema:trace , no cords palpable, no calf tenderness   Neuro: intact   Uterus: firm, well contracted, nt   Perineum: intact, healing well   Brittany pad: thin lochia    Lab Results   Component Value Date    HGB 11.5 12/17/2022    HCT 34.7 12/17/2022      Recent Results (from the past 72 hour(s))   DRUG SCREEN MULTI URINE    Collection Time: 12/17/22  9:00 PM   Result Value Ref Range    Amphetamine Screen, Urine NOT DETECTED Negative <1000 ng/mL    Barbiturate Screen, Ur NOT DETECTED Negative < 200 ng/mL    Benzodiazepine Screen, Urine NOT DETECTED Negative < 200 ng/mL    Cannabinoid Scrn, Ur NOT DETECTED Negative < 50ng/mL    Cocaine Metabolite Screen, Urine NOT DETECTED Negative < 300 ng/mL    Opiate Scrn, Ur NOT DETECTED Negative < 300ng/mL    PCP Screen, Urine NOT DETECTED Negative < 25 ng/mL    Methadone Screen, Urine NOT DETECTED Negative <300 ng/mL    Oxycodone Urine NOT DETECTED Negative <100 ng/mL    FENTANYL SCREEN, URINE NOT DETECTED Negative <1 ng/mL    Drug Screen Comment: see below    TYPE AND SCREEN    Collection Time: 12/17/22  9:20 PM   Result Value Ref Range    ABO/Rh O POS     Antibody Screen NEG CBC    Collection Time: 22  9:20 PM   Result Value Ref Range    WBC 8.8 4.5 - 11.5 E9/L    RBC 4.04 3.50 - 5.50 E12/L    Hemoglobin 11.5 11.5 - 15.5 g/dL    Hematocrit 34.7 34.0 - 48.0 %    MCV 85.9 80.0 - 99.9 fL    MCH 28.5 26.0 - 35.0 pg    MCHC 33.1 32.0 - 34.5 %    RDW 14.6 11.5 - 15.0 fL    Platelets 628 908 - 739 E9/L    MPV 10.1 7.0 - 12.0 fL       A: 29 y.o. female [id number] at 40w1d weeks  PPD #1 S/P ; Episiotomy was not needed. First degree left sulcus repaired. Patient Active Problem List   Diagnosis    Fetal growth problem suspected but not found    Supervision of low-risk pregnancy, third trimester    Pregnancy with 39 completed weeks gestation    39 weeks gestation of pregnancy    40 weeks gestation of pregnancy       P: Routine PP care.    Home tomorrow     FRANCES Zepeda CNP    2022 8:15 AM

## 2022-12-20 NOTE — PROGRESS NOTES
[facility] [place] [facility] screening results were discussed with parent. Questions answered. Brochure given to parent. Advised to monitor developmental milestones and contact physician for any concerns.    Vlad Singh

## 2022-12-21 VITALS
SYSTOLIC BLOOD PRESSURE: 124 MMHG | WEIGHT: 220 LBS | BODY MASS INDEX: 38.98 KG/M2 | TEMPERATURE: 97.5 F | HEIGHT: 63 IN | DIASTOLIC BLOOD PRESSURE: 79 MMHG | RESPIRATION RATE: 16 BRPM | OXYGEN SATURATION: 98 % | HEART RATE: 70 BPM

## 2022-12-21 PROBLEM — Z3A.39 39 WEEKS GESTATION OF PREGNANCY: Status: RESOLVED | Noted: 2022-12-17 | Resolved: 2022-12-21

## 2022-12-21 PROCEDURE — 99024 POSTOP FOLLOW-UP VISIT: CPT | Performed by: NURSE PRACTITIONER

## 2022-12-21 PROCEDURE — 6360000002 HC RX W HCPCS: Performed by: OBSTETRICS & GYNECOLOGY

## 2022-12-21 PROCEDURE — 90471 IMMUNIZATION ADMIN: CPT | Performed by: OBSTETRICS & GYNECOLOGY

## 2022-12-21 PROCEDURE — 6370000000 HC RX 637 (ALT 250 FOR IP): Performed by: OBSTETRICS & GYNECOLOGY

## 2022-12-21 PROCEDURE — 90707 MMR VACCINE SC: CPT | Performed by: OBSTETRICS & GYNECOLOGY

## 2022-12-21 RX ORDER — FERROUS SULFATE 325(65) MG
325 TABLET ORAL 2 TIMES DAILY
Qty: 60 TABLET | Refills: 1 | Status: SHIPPED | OUTPATIENT
Start: 2022-12-21

## 2022-12-21 RX ORDER — IBUPROFEN 600 MG/1
600 TABLET ORAL EVERY 6 HOURS PRN
Qty: 60 TABLET | Refills: 3 | Status: SHIPPED | OUTPATIENT
Start: 2022-12-21

## 2022-12-21 RX ADMIN — ACETAMINOPHEN 650 MG: 325 TABLET ORAL at 08:06

## 2022-12-21 RX ADMIN — MEASLES, MUMPS, AND RUBELLA VIRUS VACCINE LIVE 0.5 ML: 1000; 12500; 1000 INJECTION, POWDER, LYOPHILIZED, FOR SUSPENSION SUBCUTANEOUS at 08:10

## 2022-12-21 RX ADMIN — DOCUSATE SODIUM 100 MG: 100 CAPSULE, LIQUID FILLED ORAL at 08:06

## 2022-12-21 RX ADMIN — IBUPROFEN 600 MG: 600 TABLET, FILM COATED ORAL at 06:32

## 2022-12-21 ASSESSMENT — PAIN - FUNCTIONAL ASSESSMENT: PAIN_FUNCTIONAL_ASSESSMENT: ACTIVITIES ARE NOT PREVENTED

## 2022-12-21 ASSESSMENT — PAIN SCALES - GENERAL: PAINLEVEL_OUTOF10: 4

## 2022-12-21 ASSESSMENT — PAIN DESCRIPTION - ORIENTATION: ORIENTATION: LOWER

## 2022-12-21 ASSESSMENT — PAIN DESCRIPTION - LOCATION: LOCATION: ABDOMEN

## 2022-12-21 ASSESSMENT — PAIN DESCRIPTION - DESCRIPTORS: DESCRIPTORS: DISCOMFORT;CRAMPING

## 2022-12-21 NOTE — LACTATION NOTE
Mom reports breastfeeding is going well, baby has been cluster feeding at night. Encouraged frequent feeds to establish milk supply. Reviewed benefits and safety of skin to skin. Inst on adequate I/O and importance of keeping track of diapers at home. Instructed on signs of dehydration such as infant refusing to feed, decreased wet diapers and infant becoming listless and notify provider if these occur. Reviewed with mom the importance of notifying the physician if baby looks more jaundiced. Lactation office # given if follow-up needed, as well as other helpful resources. Encouraged to call with any concerns. Support and encouragement given.

## 2022-12-21 NOTE — PROGRESS NOTES
PPD #2     Patient:  Johanna Olivia     Admit Date:  2022  9:01 PM  Medical Record Number:  [id number]   Today's Date: 2022    S: No complaints; tolerating diet: affirms ; ambulating well: affirms; voiding without difficulty:  affirms; bm: affirms; flatus: affirms; pain meds appropriate: affirms; vaginal bleeding: thin lochia. O:   Vitals:    22 0706 22 1628 22 0017 22 0723   BP: 113/62 138/82 134/79 124/79   Pulse: 89 90 78 70   Resp: 18 18 16 16   Temp: 98.7 °F (37.1 °C) 98.6 °F (37 °C) 98 °F (36.7 °C) 97.5 °F (36.4 °C)   TempSrc: Oral Oral Oral Oral   SpO2: 98% 98% 98%    Weight:       Height:         Gen: A&O, cooperative, pleasant   Heart: RRR   Lungs: CTA b/l   Abd; soft, NT, non distended, +BS  Back: no CVA or paraspinal tenderness   Ext: No clubbing, cyanosis, edema:no , no cords palpable, no calf tenderness   Neuro: intact   Uterus: firm, well contracted, nt   Perineum: intact, healing well   Brittany pad: thin lochia    Lab Results   Component Value Date    HGB 9.8 (L) 2022    HCT 31.1 (L) 2022      Recent Results (from the past 72 hour(s))   CBC    Collection Time: 22  8:30 AM   Result Value Ref Range    WBC 8.1 4.5 - 11.5 E9/L    RBC 3.49 (L) 3.50 - 5.50 E12/L    Hemoglobin 9.8 (L) 11.5 - 15.5 g/dL    Hematocrit 31.1 (L) 34.0 - 48.0 %    MCV 89.1 80.0 - 99.9 fL    MCH 28.1 26.0 - 35.0 pg    MCHC 31.5 (L) 32.0 - 34.5 %    RDW 15.0 11.5 - 15.0 fL    Platelets 393 232 - 325 E9/L    MPV 10.0 7.0 - 12.0 fL       A: 29 y.o. female [id number] at 40w1d weeks  PPD #2 S/P ; Episiotomy was not needed  Patient Active Problem List   Diagnosis    Fetal growth problem suspected but not found    Supervision of low-risk pregnancy, third trimester    Pregnancy with 39 completed weeks gestation    44 weeks gestation of pregnancy    40 weeks gestation of pregnancy    Postpartum care and examination immediately after delivery       P: Routine PP care.    Discharge home with instructions, precautions. Prescription for Ibuprofen 600 mg. Continue PNV with Iron daily. Follow-up office 6 weeks for postpartum visit.     Eladia Pham, APRN - CNP    12/21/2022 8:24 AM

## 2022-12-21 NOTE — DISCHARGE SUMMARY
Department of Obstetrics and Gynecology  Nurse Practitioner Obstetrics Discharge Summary    Primary OB Clinician: Manuel Sommer    The patient is a 29 y.o. female,  A3, Estimated Date of Delivery: 22, EGA: 36 and 1/7 weeks. O+  Her current obstetrical history is significant for , none,   Reason for admission: Induction of Labor    PAST OB HISTORY  OB History    Para Term  AB Living   4 1 1   3 1   SAB IAB Ectopic Molar Multiple Live Births   2 1     0 1      # Outcome Date GA Lbr Thierry/2nd Weight Sex Delivery Anes PTL Lv   4 Term 22 40w1d / 00:08 8 lb 0.8 oz (3.65 kg) F Vag-Spont EPI N ROBBY   3 IAB            2 SAB            1 SAB                Past Medical History:    Past Medical History:   Diagnosis Date    Mental disorder     Depression    Trauma     previous relationship      Past Surgical History:    Past Surgical History:   Procedure Laterality Date    CYST INCISION AND DRAINAGE      from tailbone    INDUCED             Medications Prior to Admission:   Medications Prior to Admission: [DISCONTINUED] loratadine (CLARITIN) 10 MG tablet, Take 10 mg by mouth daily (Patient not taking: Reported on 2022)  Prenatal Vit-Fe Fumarate-FA (PRENATAL VITAMIN PO), Take 1 tablet by mouth daily  Allergies:  Patient has no known allergies.     Intrapartum complications: None,   Date of Delivery: 22 ; Time of Delivery: 0208   Delivery Type: spontaneous vaginal delivery  Anesthesia: Epidural  Baby: Liveborn female, Apgars 9/9#    Information for the patient's :  Jerel Rojas [[id number]]   female   Birth Weight: 8 lb 0.8 oz (3.65 kg)   Placenta:spontaneous  Laceration: 1st degree  Episiotomy: none,    Postpartum complications: no    Postpartum Procedures  None    Feeding method: breast  Rh Immune globulin given: no  Rubella vaccine given: yes    Discharge Date: 22  Early Discharge:  NO    Plan  Discharge to: Home    [unfilled]     Follow-up appointment with blanco in 6 weeks. Time Spent on Discharge:  30 minutes were spent in patient examination, evaluation, counseling as well as medication reconciliation, prescriptions for required medications, discharge plan and follow up. Obstetric Discharge Summary    Admitting Diagnosis  IUP 40.1 weeks  OB History          4    Para   1    Term   1            AB   3    Living   1         SAB   2    IAB   1    Ectopic        Molar        Multiple   0    Live Births   1                Reasons for Admission on 2022  9:01 PM  39 weeks gestation of pregnancy [Z3A.39]  No comment available  Induction of Labor    Prenatal Procedures  None    Intrapartum Procedures                 Spontaneous Vaginal Delivery: See Labor and Delivery Summary       Postpartum Procedures  None    Postpartum/Operative Complications       [employer] Data  Information for the patient's :  Kiki Petit Girl [facility] [[id number]]   female   Birth Weight: 8 lb 0.8 oz (3.65 kg)   Discharge With Mother  Complications: No    Discharge Diagnosis       Discharge Information  Current Discharge Medication List        START taking these medications    Details   ibuprofen (ADVIL;MOTRIN) 600 MG tablet Take 1 tablet by mouth every 6 hours as needed for Pain  Qty: 60 tablet, Refills: 3      ferrous sulfate (IRON 325) 325 (65 Fe) MG tablet Take 1 tablet by mouth 2 times daily  Qty: 60 tablet, Refills: 1           CONTINUE these medications which have NOT CHANGED    Details   Prenatal Vit-Fe Fumarate-FA (PRENATAL VITAMIN PO) Take 1 tablet by mouth daily           STOP taking these medications       loratadine (CLARITIN) 10 MG tablet Comments:   Reason for Stopping:               No discharge procedures on file.     Discharge to: Home

## 2022-12-21 NOTE — DISCHARGE INSTRUCTIONS
Follow-up with your OB doctor in 4 to  6 weeks for vaginal delivery unless otherwise instructed. Call office for an appointment. For breastfeeding support, you can contact our lactation specialists at [phone] or [phone]    DIET  Eat a well balanced diet focusing on foods high in fiber and protein  Drink plenty of fluids especially water. To avoid constipation you may take a mild stool softener as recommended by your doctor or midwife. ACTIVITY  Gradually increase your activity. Resume exercise regimen only after advised by your doctor or midwife. Avoid lifting anything heavier than your baby or a gallon of milk for SIX weeks. Avoid driving until your doctor or midwife has given their approval.  Allemaría Alexis slowly from a lying to sitting and then a standing position. Climb stairs one at a time. Use caution when carrying your baby up and down the stairs. No sexual activity for 6 weeks or until advised by your doctor - Nothing in vagina: intercourse, tampons, or douching. Be prepared to discuss family planning at your follow-up OB visit. You may feel tired or have a lack of energy. You may continue your prenatal vitamin to replenish nutrients post delivery. Nap when baby naps to catch up on sleep. May return to work or school in 6 weeks or as directed by OB. EMOTIONS  You may feed guzman, sad, teary, & overwhelmed. Contact your OB provider if you feel you may be showing signs of postpartum depression, or have thoughts of harming yourself or your infant. If infant will not stop crying, contact another adult for help or place infant in their crib on their back and take a break. NEVER shake your infant. BLEEDING  Vaginal bleeding will decrease in amount over the next few weeks. You will notice that as your activity increases, your flow may increase. This is your body's way of telling you, you need to take things easier and rest more often.   Call your OB/ER if you are saturating more than one maxi pad in an hour. BREAST CARE  Take medications as recommended by your doctor or midwife for pain  If you develop a warm, red, tender area on your breast or develop a fever contact your OB provider. For breastfeeding moms:  If you become engorged, feeding may be more difficult or painful for 1-2 days. You may find it helpful to hand express some milk so that the infant can latch on more easily. While breastfeeding, continue to take your prenatal vitamins as directed by your doctor or midwife. Only take medications verified as safe for breastfeeding. For non-breastfeeding moms:  You may apply ice packs to your breasts over your bra for twenty minutes at a time for comfort. Avoid stimulation to your breasts, when showering allow the water to strike your back not your breasts. Wear a good fitting bra until your milk dries, such as a sports bra. JOSHUA CARE    Use the joshua-bottle after toileting until bleeding stops. Cleanse your perineum from front to back  You may use a sitz bath or soak in a clean tub as needed for comfort. Kegel exercises will help restore bladder control. SWELLING  Keep your legs elevated when sitting or lying. When wearing stocking or socks, make sure they are not too tight. WHEN TO CALL THE DOCTOR  If you have a temp of 100.4 or more. If your bleeding has increased and you are saturating a pad in an hour. Your abdomen is tender to touch. You are passing blood clots bigger than the size of a lemon. If you are experiencing extreme weakness or dizziness. If you are having flu-like symptoms such as achy muscles or joints. There is a foul smell or a green color to your vaginal bleeding. If you have pain that cannot be relieved. You have persistent burning with urination or frequency. Call if you have concerns about your well-being. You are unable to sleep, eat, or are having thoughts of harming yourself or your baby.    You have a red, warm, tender area in your calf. Never Shake a Baby Promise    Shaking can kill a baby. It can also cause seizures, brain damage, learning problems, cerebral palsy, blindness and other serious health and developmental problems. I understand that shaking a baby is a serious form of child abuse. I Promise Never To Shake My Baby    I understand that caregivers other then the mother often shake babies. I also promise to discuss the dangers of shaking a baby with everyone who takes care of my baby. I promise to tell anyone who cares for my baby to never, never shake my baby.

## 2023-01-31 ENCOUNTER — POSTPARTUM VISIT (OUTPATIENT)
Dept: OBGYN | Age: 33
End: 2023-01-31
Payer: MEDICAID

## 2023-01-31 VITALS
HEIGHT: 63 IN | WEIGHT: 209.5 LBS | HEART RATE: 99 BPM | DIASTOLIC BLOOD PRESSURE: 88 MMHG | SYSTOLIC BLOOD PRESSURE: 130 MMHG | BODY MASS INDEX: 37.12 KG/M2

## 2023-01-31 DIAGNOSIS — Z30.011 ENCOUNTER FOR INITIAL PRESCRIPTION OF CONTRACEPTIVE PILLS: ICD-10-CM

## 2023-01-31 PROBLEM — Z3A.40 40 WEEKS GESTATION OF PREGNANCY: Status: RESOLVED | Noted: 2022-12-19 | Resolved: 2023-01-31

## 2023-01-31 PROBLEM — Z34.93 SUPERVISION OF LOW-RISK PREGNANCY, THIRD TRIMESTER: Status: RESOLVED | Noted: 2022-12-09 | Resolved: 2023-01-31

## 2023-01-31 PROBLEM — Z03.74 FETAL GROWTH PROBLEM SUSPECTED BUT NOT FOUND: Status: RESOLVED | Noted: 2022-10-27 | Resolved: 2023-01-31

## 2023-01-31 PROBLEM — Z3A.39 PREGNANCY WITH 39 COMPLETED WEEKS GESTATION: Status: RESOLVED | Noted: 2022-12-14 | Resolved: 2023-01-31

## 2023-01-31 PROCEDURE — 99213 OFFICE O/P EST LOW 20 MIN: CPT | Performed by: OBSTETRICS & GYNECOLOGY

## 2023-01-31 RX ORDER — ACETAMINOPHEN AND CODEINE PHOSPHATE 120; 12 MG/5ML; MG/5ML
SOLUTION ORAL
Qty: 28 TABLET | Refills: 2 | Status: SHIPPED | OUTPATIENT
Start: 2023-01-31

## 2023-01-31 ASSESSMENT — ENCOUNTER SYMPTOMS
WHEEZING: 0
CONSTIPATION: 0
NAUSEA: 0
SHORTNESS OF BREATH: 0
BLOOD IN STOOL: 0
DIARRHEA: 0
VOMITING: 0
ABDOMINAL PAIN: 0
COUGH: 0

## 2023-01-31 NOTE — PROGRESS NOTES
Perla Miguel is a 51-year-old  female who had an  on  at 40 weeks 1 day a female infant weighing 8 pounds. Her pregnancy was not complicated by HTN, GDM PPH. She has continued to go through counseling and feels this has been very helpful. She denies any family history changes. She is not going back to work at this time. FOB is involved and she feels safe with him. No TAD. Her last Pap was 2022 and normal.  Denies any SS of postpartum depression.     Patient presents for annual exam.     Past Medical History:   Diagnosis Date    Mental disorder     Depression    Trauma     previous relationship        Past Surgical History:   Procedure Laterality Date    CYST INCISION AND DRAINAGE      from tailbone    INDUCED               Family History   Problem Relation Age of Onset    Diabetes Maternal Aunt     Breast Cancer Maternal Grandmother           Current Outpatient Medications:     norethindrone (ORTHO MICRONOR) 0.35 MG tablet, Take 1 tablet daily at the same time every day, Disp: 28 tablet, Rfl: 2    ibuprofen (ADVIL;MOTRIN) 600 MG tablet, Take 1 tablet by mouth every 6 hours as needed for Pain, Disp: 60 tablet, Rfl: 3    ferrous sulfate (IRON 325) 325 (65 Fe) MG tablet, Take 1 tablet by mouth 2 times daily, Disp: 60 tablet, Rfl: 1    Prenatal Vit-Fe Fumarate-FA (PRENATAL VITAMIN PO), Take 1 tablet by mouth daily, Disp: , Rfl:      No Known Allergies     Social History       Tobacco History       Smoking Status  Former Quit Date  2019 Smoking Tobacco Type  Cigarettes quit in 2019      Smokeless Tobacco Use  Never              Alcohol History       Alcohol Use Status  Not Currently Drinks/Week  0 Glasses of wine, 0 Cans of beer, 0 Shots of liquor, 0 Drinks containing 0.5 oz of alcohol per week Comment  social              Drug Use       Drug Use Status  Never Comment  [facility]              Sexual Activity       Sexually Active  Yes Partners  Male Review of Systems   Constitutional:  Negative for fever. HENT:  Negative for hearing loss. Eyes:  Negative for visual disturbance. Respiratory:  Negative for cough, shortness of breath and wheezing. Cardiovascular:  Negative for chest pain and palpitations. Gastrointestinal:  Negative for abdominal pain, blood in stool, constipation, diarrhea, nausea and vomiting. Genitourinary:  Negative for dysuria, enuresis, frequency, hematuria, urgency, vaginal discharge and vaginal pain. Musculoskeletal:  Negative for arthralgias and myalgias. Skin:  Negative for rash. Neurological:  Negative for headaches. Hematological:  Does not bruise/bleed easily. Psychiatric/Behavioral:  Negative for agitation, dysphoric mood, self-injury, sleep disturbance and suicidal ideas. The patient is not nervous/anxious. Vitals:    01/31/23 1355   BP: 130/88   Pulse: 99        Physical Exam  Constitutional:       Appearance: Normal appearance. Genitourinary:      Bladder and urethral meatus normal.      Right Labia: No lesions or skin changes. Left Labia: No lesions or skin changes. No vaginal prolapse present. No vaginal atrophy present. Right Adnexa: not tender and no mass present. Left Adnexa: mass present. Left Adnexa: not tender. Cervical motion tenderness present. Uterus is not enlarged, tender or prolapsed. No asymmetrical contractions present and no pelvic spasms present. Cardiovascular:      Rate and Rhythm: Normal rate and regular rhythm. Pulmonary:      Effort: Pulmonary effort is normal. No respiratory distress. Abdominal:      General: There is no distension. Tenderness: There is no abdominal tenderness. Neurological:      Mental Status: She is alert. Skin:     General: Skin is warm. Psychiatric:         Mood and Affect: Mood normal.        [facility] was [facility] today for postpartum care.     Diagnoses and all orders for this visit:    6 weeks postpartum follow-up    Encounter for initial prescription of contraceptive pills  -     norethindrone (ORTHO MICRONOR) 0.35 MG tablet; Take 1 tablet daily at the same time every day  No Pap indicated today. We reviewed SS postpartum depression and when to call. We did encourage her to continue with her counseling. She is still interested in the Nexplanon so we will call her as soon it is available for insertion in the meantime she is starting a progesterone only pill. We reviewed risks benefits side effects and how to take. Return for We will call you soon as the C/ Darryl 9 arrives for insertion. Marvin Stafford, DO

## 2023-01-31 NOTE — PROGRESS NOTES
Pt here for 6 week pp visit. Pt delivered vaginally 12/17/2022. Pt stated only taking iron taking iron 1 time per day. Pt is getting nexplanon all info faxed but did not receive it yet. Pt has no concerns and doing well. Pt had pap 5/27/22 and was negative.

## 2023-02-06 ENCOUNTER — TELEPHONE (OUTPATIENT)
Dept: OBGYN | Age: 33
End: 2023-02-06

## 2023-02-06 NOTE — TELEPHONE ENCOUNTER
Pt called concerned with heavy bleeding. Heavy but not bleeding through 2 pads in 1 hr. Pt is currently breast feeding and at pp visit 1/31 Manuel dolan started pt on ortho micronor 0.35 pt is on day 7 of her 1st pill pack. Per dr. Radha Bradshaw that is normal and nothing else to do, her  body has to adjust to the bcp.

## 2023-04-27 ENCOUNTER — PROCEDURE VISIT (OUTPATIENT)
Dept: OBGYN CLINIC | Age: 33
End: 2023-04-27
Payer: MEDICAID

## 2023-04-27 VITALS
SYSTOLIC BLOOD PRESSURE: 134 MMHG | HEART RATE: 88 BPM | WEIGHT: 210 LBS | DIASTOLIC BLOOD PRESSURE: 80 MMHG | BODY MASS INDEX: 37.2 KG/M2

## 2023-04-27 DIAGNOSIS — Z30.017 ENCOUNTER FOR INITIAL PRESCRIPTION OF IMPLANTABLE SUBDERMAL CONTRACEPTIVE: Primary | ICD-10-CM

## 2023-04-27 PROCEDURE — 99213 OFFICE O/P EST LOW 20 MIN: CPT | Performed by: OBSTETRICS & GYNECOLOGY

## 2023-04-27 PROCEDURE — 99999 PR OFFICE/OUTPT VISIT,PROCEDURE ONLY: CPT | Performed by: OBSTETRICS & GYNECOLOGY

## 2023-04-27 PROCEDURE — 11981 INSERTION DRUG DLVR IMPLANT: CPT | Performed by: OBSTETRICS & GYNECOLOGY

## 2023-04-27 RX ORDER — ETONOGESTREL 68 MG/1
IMPLANT SUBCUTANEOUS
COMMUNITY
Start: 2023-04-14

## 2023-04-27 NOTE — PROGRESS NOTES
Nexplanon insertion    Preop desires long-term reversible contraception    Postop same    Procedure Nexplanon placement    Performing physician  Jd ROSENBERG    Procedure  Pregnancy test neg  Site 8 cm above the medial epicondyle  Sterile prep  Informed consent obtained reviewed irregular bleeding effectiveness, duration of use    The insertion site was selected 8 cm from the medial epicondyle and marked. The procedure area was prepped with Betadine and 3 mL of 1% lidocaine without epi injected subcutaneously. Anesthesia was confirmed. Nexplanon trocar was inserted subcutaneously and the Nexplanon capsule delivered subcutaneously. Trocar was removed from the insertion site. Nexplanon capsule was palpated by the provider.   EBL none  Bandage and wrap applied  Instructions given

## 2024-06-28 ENCOUNTER — OFFICE VISIT (OUTPATIENT)
Dept: FAMILY MEDICINE CLINIC | Age: 34
End: 2024-06-28
Payer: COMMERCIAL

## 2024-06-28 VITALS
OXYGEN SATURATION: 98 % | DIASTOLIC BLOOD PRESSURE: 80 MMHG | WEIGHT: 201.6 LBS | SYSTOLIC BLOOD PRESSURE: 130 MMHG | BODY MASS INDEX: 35.72 KG/M2 | TEMPERATURE: 97.5 F | HEIGHT: 63 IN | HEART RATE: 105 BPM

## 2024-06-28 DIAGNOSIS — J06.9 URI WITH COUGH AND CONGESTION: Primary | ICD-10-CM

## 2024-06-28 PROCEDURE — 99213 OFFICE O/P EST LOW 20 MIN: CPT

## 2024-06-28 RX ORDER — CEFDINIR 300 MG/1
300 CAPSULE ORAL 2 TIMES DAILY
Qty: 20 CAPSULE | Refills: 0 | Status: SHIPPED | OUTPATIENT
Start: 2024-06-28 | End: 2024-07-08

## 2024-06-28 RX ORDER — METHYLPREDNISOLONE 4 MG/1
TABLET ORAL
Qty: 1 KIT | Refills: 0 | Status: SHIPPED | OUTPATIENT
Start: 2024-06-28

## 2024-06-28 NOTE — PROGRESS NOTES
exudate  Nose: Mild congestion of the nasal mucosa. There is injection to middle turbinates bilaterally.   Throat: Mild posterior pharyngeal erythema with mild post nasal drip present.  No exudate or tonsillar hypertrophy noted.    Neck:  Supple. There is no anterior cervical adenopathy.  Lungs: CTAB without wheezes, rales, or rhonchi  Heart:  Regular rate and rhythm, normal heart sounds, without pathological murmurs, ectopy, gallops, or rubs.  Skin:  Normal turgor.  Warm, dry, without visible rash.  Neurological:  Alert and oriented.  Motor functions intact.  Responds to verbal commands.     Lab / Imaging Results   (All laboratory and radiology results have been personally reviewed by myself)  Labs:  No results found for this visit on 06/28/24.    Imaging:  All Radiology results interpreted by Radiologist unless otherwise noted.      Assessment / Plan     Impression(s):  Paulina was seen today for nasal congestion, cough, fatigue, headache and eye problem.    Diagnoses and all orders for this visit:    URI with cough and congestion  -     methylPREDNISolone (MEDROL DOSEPACK) 4 MG tablet; Take by mouth.  -     cefdinir (OMNICEF) 300 MG capsule; Take 1 capsule by mouth 2 times daily for 10 days      Disposition:  Disposition: Discharge to home.    Vital signs, past medical history, medications, and allergies reviewed at visit.    Increase fluids and rest. Additional symptomatic relief discussed.    Script written for cefdinir and Medrol Dosepak, side effects discussed.      Follow up PCP no improvement after 7-10 days of symptoms. ED sooner if symptoms worsen or change. Red flag symptoms discussed. Pt is in agreement with this care plan. All questions answered.    TAINA Baumann    **This report was transcribed using voice recognition software. Every effort was made to ensure accuracy; however, inadvertent computerized transcription errors may be present.

## 2025-04-02 ENCOUNTER — OFFICE VISIT (OUTPATIENT)
Dept: FAMILY MEDICINE CLINIC | Age: 35
End: 2025-04-02
Payer: COMMERCIAL

## 2025-04-02 VITALS
TEMPERATURE: 98.3 F | SYSTOLIC BLOOD PRESSURE: 122 MMHG | HEIGHT: 63 IN | HEART RATE: 89 BPM | DIASTOLIC BLOOD PRESSURE: 80 MMHG | BODY MASS INDEX: 37.1 KG/M2 | RESPIRATION RATE: 18 BRPM | OXYGEN SATURATION: 97 % | WEIGHT: 209.4 LBS

## 2025-04-02 DIAGNOSIS — R68.89 FLU-LIKE SYMPTOMS: ICD-10-CM

## 2025-04-02 DIAGNOSIS — J01.90 ACUTE SINUSITIS, RECURRENCE NOT SPECIFIED, UNSPECIFIED LOCATION: Primary | ICD-10-CM

## 2025-04-02 DIAGNOSIS — R05.9 COUGH, UNSPECIFIED TYPE: ICD-10-CM

## 2025-04-02 DIAGNOSIS — J34.89 SINUS PRESSURE: ICD-10-CM

## 2025-04-02 LAB
INFLUENZA A ANTIBODY: NORMAL
INFLUENZA B ANTIBODY: NORMAL
Lab: NORMAL
PERFORMING INSTRUMENT: NORMAL
QC PASS/FAIL: NORMAL
SARS-COV-2, POC: NORMAL

## 2025-04-02 PROCEDURE — 87804 INFLUENZA ASSAY W/OPTIC: CPT

## 2025-04-02 PROCEDURE — 99213 OFFICE O/P EST LOW 20 MIN: CPT

## 2025-04-02 PROCEDURE — 87426 SARSCOV CORONAVIRUS AG IA: CPT

## 2025-04-02 RX ORDER — AMOXICILLIN 875 MG/1
875 TABLET, COATED ORAL EVERY 12 HOURS
Qty: 20 TABLET | Refills: 0 | Status: SHIPPED | OUTPATIENT
Start: 2025-04-02 | End: 2025-04-12

## 2025-04-02 RX ORDER — TRIAMCINOLONE ACETONIDE 40 MG/ML
40 INJECTION, SUSPENSION INTRA-ARTICULAR; INTRAMUSCULAR ONCE
Status: COMPLETED | OUTPATIENT
Start: 2025-04-02 | End: 2025-04-02

## 2025-04-02 RX ADMIN — TRIAMCINOLONE ACETONIDE 40 MG: 40 INJECTION, SUSPENSION INTRA-ARTICULAR; INTRAMUSCULAR at 11:05

## 2025-04-02 NOTE — PROGRESS NOTES
Chief Complaint       Nasal Congestion (All symptoms started Sunday.), Cough (Coughing  up mucous-yellow), Facial Pain (Face pressure), and Headache (Frontal.)    History of Present Illness   Source of history provided by:  patient.  History of Present Illness  The patient is a 34-year-old female who presents for evaluation of sinus infection.    Symptoms began on Sunday, initially presenting as a sore throat. She attributes this to a 2-hour swim lesson she conducted on Saturday. Subsequently, congestion, cough, mucus production, and sinus pressure developed, leading her to suspect a sinus infection. No fevers, chills, or body aches are reported, but she mentions feeling unusually warm. A temperature check last night confirmed the absence of fever. Mild ear discomfort is noted. She is currently breastfeeding and took DayQuil yesterday.    All other ROS negative unless otherwise stated in HPI.      ROS    Unless otherwise stated in this report or unable to obtain because of the patient's clinical or mental status as evidenced by the medical record, this patients's positive and negative responses for Review of Systems, constitutional, psych, eyes, ENT, cardiovascular, respiratory, gastrointestinal, neurological, genitourinary, musculoskeletal, integument systems and systems related to the presenting problem are either stated in the preceding or were not pertinent or were negative for the symptoms and/or complaints related to the medical problem.      Physical Exam         VS:  /80   Pulse 89   Temp 98.3 °F (36.8 °C) (Temporal)   Resp 18   Ht 1.6 m (5' 3\")   Wt 95 kg (209 lb 6.4 oz)   LMP 03/31/2025   SpO2 97%   Breastfeeding Yes   BMI 37.09 kg/m²    Oxygen Saturation Interpretation: Normal.    Constitutional:  Alert, development consistent with age.  Ears:  External Ears: Bilateral pinna normal. TMs serous effusions bilaterally without erythema or perforation bilaterally.  Canals normal bilaterally